# Patient Record
Sex: FEMALE | Race: WHITE | NOT HISPANIC OR LATINO | Employment: FULL TIME | URBAN - METROPOLITAN AREA
[De-identification: names, ages, dates, MRNs, and addresses within clinical notes are randomized per-mention and may not be internally consistent; named-entity substitution may affect disease eponyms.]

---

## 2020-11-09 ENCOUNTER — TRANSFERRED RECORDS (OUTPATIENT)
Dept: HEALTH INFORMATION MANAGEMENT | Facility: CLINIC | Age: 50
End: 2020-11-09

## 2022-09-20 ENCOUNTER — TRANSFERRED RECORDS (OUTPATIENT)
Dept: HEALTH INFORMATION MANAGEMENT | Facility: CLINIC | Age: 52
End: 2022-09-20

## 2022-12-15 ENCOUNTER — PATIENT OUTREACH (OUTPATIENT)
Dept: PULMONOLOGY | Facility: CLINIC | Age: 52
End: 2022-12-15

## 2022-12-15 ENCOUNTER — TELEPHONE (OUTPATIENT)
Dept: PULMONOLOGY | Facility: CLINIC | Age: 52
End: 2022-12-15

## 2022-12-15 DIAGNOSIS — J84.9 ILD (INTERSTITIAL LUNG DISEASE) (H): Primary | ICD-10-CM

## 2022-12-15 NOTE — TELEPHONE ENCOUNTER
Spoke with pt after receiving communication from CAMDEN Garcia, and Dr. Villarreal to add patient on for new consult on 12/21. Recent CT and FPFT completed through [facility] (records received). 6MWT scheduled and appt scheduled with Dr. Villarreal. Details confirmed with pt. Initially CT was scheduled, but deemed no longer needed after Dr. Villarreal reviewed outside CT. This was communicated to patient via CAMDEN Garcia.

## 2022-12-15 NOTE — PROGRESS NOTES
ILD New Patient Referral: Pre visit communication    Patient: Shelby Bennett  Reason for Referral: RA ILD  Referring Physician: Dr. Colton Roblero  Referring Clinic/Contact: Phone#:     Chest CT scan: [facility]  Biopsy: N/A  PFT's:YES. Date-12/14/2022. Location-[facility]   Additional testing: NO    Current symptoms: SOB/JONES  Current related prescriptions: YES. Prednisone    Supplemental oxygen? Unknown    In review of apparent records and conversation with patient; recommend first visit with ILD provider be conducted :  Chest CT and 6 MWT and appointment with Dr. Villarreal    Additional notes:

## 2022-12-16 ENCOUNTER — TELEPHONE (OUTPATIENT)
Dept: PULMONOLOGY | Facility: CLINIC | Age: 52
End: 2022-12-16

## 2022-12-16 NOTE — TELEPHONE ENCOUNTER
Fax cover sheet, demographic sheet, signed CRYSTAL to release information to clinic and medical records request (all office notes with Dr. Chaudhry from 2020 to current including visit with Dr. Chaudhry on 12/19/22) faxed to HIM at [facility] [phone].      [facility]  [phone]. Will follow up on Monday afternoon.

## 2022-12-19 ENCOUNTER — TRANSFERRED RECORDS (OUTPATIENT)
Dept: HEALTH INFORMATION MANAGEMENT | Facility: CLINIC | Age: 52
End: 2022-12-19

## 2022-12-19 LAB
ALT SERPL-CCNC: 15 IU/L (ref 5–35)
AST SERPL-CCNC: 19 U/L (ref 5–34)
CREATININE (EXTERNAL): 0.79 MG/DL (ref 0.5–1.3)
HEP C HIM: NORMAL

## 2022-12-21 ENCOUNTER — OFFICE VISIT (OUTPATIENT)
Dept: PULMONOLOGY | Facility: CLINIC | Age: 52
End: 2022-12-21
Attending: INTERNAL MEDICINE
Payer: COMMERCIAL

## 2022-12-21 VITALS
HEART RATE: 83 BPM | WEIGHT: 179 LBS | DIASTOLIC BLOOD PRESSURE: 81 MMHG | HEIGHT: 65 IN | OXYGEN SATURATION: 96 % | SYSTOLIC BLOOD PRESSURE: 123 MMHG | BODY MASS INDEX: 29.82 KG/M2

## 2022-12-21 DIAGNOSIS — J21.9 BRONCHIOLITIS: ICD-10-CM

## 2022-12-21 DIAGNOSIS — J84.9 ILD (INTERSTITIAL LUNG DISEASE) (H): Primary | ICD-10-CM

## 2022-12-21 DIAGNOSIS — J84.9 ILD (INTERSTITIAL LUNG DISEASE) (H): ICD-10-CM

## 2022-12-21 LAB
6 MIN WALK (FT): 1250 FT
6 MIN WALK (M): 381 M

## 2022-12-21 PROCEDURE — 250N000011 HC RX IP 250 OP 636: Performed by: INTERNAL MEDICINE

## 2022-12-21 PROCEDURE — G0009 ADMIN PNEUMOCOCCAL VACCINE: HCPCS | Performed by: INTERNAL MEDICINE

## 2022-12-21 PROCEDURE — 90677 PCV20 VACCINE IM: CPT | Performed by: INTERNAL MEDICINE

## 2022-12-21 PROCEDURE — 99212 OFFICE O/P EST SF 10 MIN: CPT | Mod: 25 | Performed by: INTERNAL MEDICINE

## 2022-12-21 PROCEDURE — 99215 OFFICE O/P EST HI 40 MIN: CPT | Mod: 25 | Performed by: INTERNAL MEDICINE

## 2022-12-21 PROCEDURE — G0463 HOSPITAL OUTPT CLINIC VISIT: HCPCS | Mod: 25

## 2022-12-21 PROCEDURE — 99417 PROLNG OP E/M EACH 15 MIN: CPT | Performed by: INTERNAL MEDICINE

## 2022-12-21 PROCEDURE — 94618 PULMONARY STRESS TESTING: CPT | Performed by: INTERNAL MEDICINE

## 2022-12-21 RX ORDER — SULFASALAZINE 500 MG/1
500 TABLET ORAL 2 TIMES DAILY
COMMUNITY
Start: 2022-11-16

## 2022-12-21 RX ORDER — FOLIC ACID 1 MG/1
2000 TABLET ORAL DAILY
COMMUNITY
Start: 2022-11-23 | End: 2023-07-14

## 2022-12-21 RX ORDER — LEVOTHYROXINE SODIUM 200 UG/1
175 TABLET ORAL DAILY
COMMUNITY
Start: 2022-11-16 | End: 2024-10-03

## 2022-12-21 RX ORDER — LISINOPRIL 5 MG/1
1 TABLET ORAL DAILY
COMMUNITY
Start: 2022-11-16

## 2022-12-21 RX ORDER — SULFAMETHOXAZOLE/TRIMETHOPRIM 800-160 MG
1 TABLET ORAL
COMMUNITY
Start: 2022-12-16 | End: 2023-04-13

## 2022-12-21 RX ORDER — PREDNISONE 10 MG/1
TABLET ORAL
COMMUNITY
Start: 2022-12-15 | End: 2024-10-03

## 2022-12-21 RX ORDER — HYDROXYCHLOROQUINE SULFATE 200 MG/1
200 TABLET, FILM COATED ORAL 2 TIMES DAILY
COMMUNITY
Start: 2022-11-23

## 2022-12-21 RX ADMIN — PNEUMOCOCCAL 20-VALENT CONJUGATE VACCINE 0.5 ML
2.2; 2.2; 2.2; 2.2; 2.2; 2.2; 2.2; 2.2; 2.2; 2.2; 2.2; 2.2; 2.2; 2.2; 2.2; 2.2; 4.4; 2.2; 2.2; 2.2 INJECTION, SUSPENSION INTRAMUSCULAR at 16:00

## 2022-12-21 ASSESSMENT — PAIN SCALES - GENERAL: PAINLEVEL: NO PAIN (0)

## 2022-12-21 NOTE — LETTER
12/21/2022         RE: Shelby Bennett  [address] [address] [place] [address]        Dear Colleague,    Thank you for referring your patient, Shelby Bennett, to the [facility] [place]. Please see a copy of my visit note below.    [facility]    Reason for Visit  Shelby Bennett is a 52 year old year old female who is being seen for Interstitial Lung Disease (ILD) (New ILD )    HPI   Shelby Bennett is a 52-year-old new patient who is being seen for evaluation and management of interstitial lung disease.  She has rheumatoid arthritis, which was diagnosed in 2003.  She had been treated with methotrexate from 2011 until a few months ago.  Her current medications include Plaquenil and sulfasalazine.  In 2007, she developed nausea, vomiting, weight loss, dyspnea on exertion and elevated creatinine.  Abdomen CT showed multiple liver masses.  Liver biopsy was performed that, by report, showed extensive granulomatous inflammation with focal necrosis.  Special stains and tissue cultures were negative.  She was given a diagnosis of possible sarcoidosis at that time but did not get treated.  However, a chest x-ray report from 2007 indicated normal lungs.    She was in her usual state of health until this past summer.  She was able to walk without limitation, even [place]hill at their cabin.  She then noticed some worsening of her rheumatoid arthritis, and sulfasalazine was added over the summer.  This summer, she also developed a cough.  The cough is dry, and she denies postnasal drip.  She does not endorse GERD symptoms, but she wonders if she might have GERD.  She also endorses dyspnea on exertion for approximately a month, and she notices this when she walks up stairs or walks uphill.  She was evaluated for her cough and dyspnea on exertion this fall.  She was told that she had pneumonia  based on an abnormal chest x-ray, and she was  treated with at least 3 different antibiotics.  She eventually was referred to a pulmonologist and saw Dr. Coats in [place].  He ordered a high-resolution chest CT and a PFT and diagnosed her with interstitial lung disease and started prednisone 40 mg daily on December 15.    Today, Shelby reports that the dyspnea on exertion  seems to be better with prednisone.  She also thinks that the dry cough has decreased with prednisone.  She is very active and [place] 12,000 [place]  She reports that her joints are under good control now.  She denies paroxysmal nocturnal dyspnea, fevers, skin rashes, syncope, palpitations or wheezing.  She has received the flu vaccine.  There are no PFTs or chest CT scans prior to December for comparison.    ILD exposure questions:  Occupation:   Asbestos: no  Silica: no  Mold: no  Pet bird: no  Hot tub: no  Portable humidifier: no  Brass or woodwind instruments: not since high school  Smoking tobacco or marijuana: no, no vaping  Feather pillow/blanket: no  Gardening: yes  Hobbies: walking, gardening, reading, fishing, travelling  Chemotherapy or radiation therapy: no  Fam hx of ILD: no           Current Outpatient Medications   Medication     cholecalciferol (VITAMIN D3) 25 mcg (1000 units) capsule     folic acid (FOLVITE) 1 MG tablet     hydroxychloroquine (PLAQUENIL) 200 MG tablet     levothyroxine (SYNTHROID/LEVOTHROID) 200 MCG tablet     lisinopril (ZESTRIL) 5 MG tablet     predniSONE (DELTASONE) 10 MG tablet     sulfamethoxazole-trimethoprim (BACTRIM DS) 800-160 MG tablet     sulfaSALAzine (AZULFIDINE) 500 MG tablet     No current facility-administered medications for this visit.     No Known Allergies  Past Medical History:   Diagnosis Date     ILD (interstitial lung disease) (H)     RA ILD dx 2022     RA (rheumatoid arthritis) (H)     dx 2003; 2022 +RF and CCP, but initially seroneg; methotrexate [id number]       No past  "surgical history on file.    Social History     Socioeconomic History     Marital status:      Spouse name: Not on file     Number of children: Not on file     Years of education: Not on file     Highest education level: Not on file   Occupational History     Not on file   Tobacco Use     Smoking status: Never     Smokeless tobacco: Never   Substance and Sexual Activity     Alcohol use: Yes     Drug use: Never     Sexual activity: Not on file   Other Topics Concern     Not on file   Social History Narrative     Not on file     Social Determinants of Health     Financial Resource Strain: Not on file   Food Insecurity: Not on file   Transportation Needs: Not on file   Physical Activity: Not on file   Stress: Not on file   Social Connections: Not on file   Intimate Partner Violence: Not on file   Housing Stability: Not on file       Family History   Problem Relation Age of Onset     Rheumatoid Arthritis Sister      Interstitial Lung Disease No family hx of          Vitals: /81 (BP Location: Right arm, Patient Position: Sitting, Cuff Size: Adult Regular)   Pulse 83   Ht 1.651 m (5' 5\")   Wt 81.2 kg (179 lb)   SpO2 96%   BMI 29.79 kg/m      Exam:   GENERAL APPEARANCE: Well developed, well nourished, alert, and in no apparent distress.  LYMPHATICS: No significant cervical, or supraclavicular nodes.  RESP: good air flow throughout.  No crackles. No rhonchi. No wheezes.  Bilateral scattered inspiratory squeaks.  CV: Normal S1, S2, regular rhythm, normal rate. No murmur.  No LE edema.   MS: extremities normal. No clubbing. No cyanosis.  SKIN: no rash on limited exam.  NEURO: Mentation intact, speech normal, normal gait and stance.  PSYCH: mentation appears normal. and affect normal/bright.    Results:  Recent Results (from the past 168 hour(s))   6 minute walk test    Collection Time: 12/21/22 12:00 AM   Result Value Ref Range    6 min walk (FT) 1,250 ft    6 Min Walk (M) 381 m   General PFT Lab (Please " always keep checked)    Collection Time: 12/21/22  1:46 PM   Result Value Ref Range    FIO2-Pre 21.00 %        FVC FEV1 TLC DLCO   12- [facility] 2.54 71% 2.31 81% 4.01 73% 11.14 52%       I reviewed pulmonary function test that was performed at [facility] on December 14, per my table above.  This shows mild restriction with a moderate diffusion defect.  I also reviewed 6-minute walk test that was performed today.  Walk distance is reduced on room air without hypoxemia.    I also reviewed outside chest CT from December 14.  This shows mosaic attenuation and air trapping, traction bronchiectasis, peribronchovascular distribution of groundglass and reticular opacities.  This is not a UIP pattern.  Differential includes fibrotic NSIP with small airways disease.    I reviewed results with the patient.      Assessment and plan:  Shelby Bennett is a 52-year-old new patient who is being seen for evaluation and management of interstitial lung disease.   1.  Rheumatoid arthritis interstitial lung disease.  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  Physical exam corroborates this with the presence of inspiratory squeaks.  The combination of fibrotic NSIP and small airways disease would be consistent with rheumatoid arthritis lung disease.  The absence of a UIP pattern on chest CT might indicate a better prognosis.  She is not a smoker, so smoking-related ILD is unlikely.  She is on no medications that would cause ILD, and I think that methotrexate is an unlikely culprit given that she has been on it for 11 years and developed symptoms just in the past few months.  I also think that the CT scan pattern is less likely to be sarcoidosis.     There is evidence of inflammation on the chest CT scan by my review, so I think that prednisone is reasonable at this time.  I will review the chest CT scan with Dr. Smart in radiology.  We will  contact Shelby with further recommendations for the prednisone taper, but for now she will continue 40 mg daily.  If she needs a biologic for her rheumatoid arthritis, then I would avoid leflunomide.  Any of the other Biologics would be okay, such as abatacept, rituximab or tocilizumab.  There is good data that tocilizumab is beneficial for scleroderma ILD, so it is possible that it might be beneficial for rheumatoid arthritis ILD.  Nintedanib also might be an option in the future, more as an antifibrotic.  I would not start that now because of the risk to benefit ratio with high incidence of diarrhea.  I will get an echocardiogram in the next 1 to 2 weeks to evaluate for pulmonary hypertension as the PFT shows that the diffusing capacity is decreased out of proportion to the FVC.  I also recommended that she get the pneumonia vaccine, and she agreed.  We gave her the Prevnar 20 today.  I encouraged her to stay physically active with minimum of 10,000 steps per day.  She will return in 3 months with full PFT and repeat 6-minute walk test.  2.  High risk medication immunosuppression monitoring.  She is on Bactrim for pneumocystis prophylaxis, and that will be continued.  I also counseled her to wear a mask in public.  She asked about travel, and that would be okay as long as she wears a mask.  I spent 132 minutes reviewing chart, talking with and examining patient, reviewing test results, formulating plan and documentation on the day of the encounter (excluding PFT review).    Again, thank you for allowing me to participate in the care of your patient.        Sincerely,        Maria Antonia Villarreal MD

## 2022-12-21 NOTE — PROGRESS NOTES
[facility] Disease [facility]    Reason for Visit  Shelby Bennett is a 52 year old year old female who is being seen for Interstitial Lung Disease (ILD) (New ILD )    HPI   Shelby Bennett is a 52-year-old new patient who is being seen for evaluation and management of interstitial lung disease.  She has rheumatoid arthritis, which was diagnosed in 2003.  She had been treated with methotrexate from 2011 until a few months ago.  Her current medications include Plaquenil and sulfasalazine.  In 2007, she developed nausea, vomiting, weight loss, dyspnea on exertion and elevated creatinine.  Abdomen CT showed multiple liver masses.  Liver biopsy was performed that, by report, showed extensive granulomatous inflammation with focal necrosis.  Special stains and tissue cultures were negative.  She was given a diagnosis of possible sarcoidosis at that time but did not get treated.  However, a chest x-ray report from 2007 indicated normal lungs.    She was in her usual state of health until this past summer.  She was able to walk without limitation, even [place]hill [place] their cabin.  She then noticed some worsening of her rheumatoid arthritis, and sulfasalazine was added over the summer.  This summer, she also developed a cough.  The cough is dry, and she denies postnasal drip.  She does not endorse GERD symptoms, but she wonders if she might have GERD.  She also endorses dyspnea on exertion for approximately a month, and she notices this when she walks up stairs or walks uphill.  She was evaluated for her cough and dyspnea on exertion this fall.  She was told that she had pneumonia  based on an abnormal chest x-ray, and she was treated with at least 3 different antibiotics.  She eventually was referred to a pulmonologist and saw Dr. Coats in [place].  He ordered a high-resolution chest CT and a PFT and diagnosed her with interstitial lung disease and started prednisone 40 mg daily on December 15.    Today,  Shelby reports that the dyspnea on exertion  seems to be better with prednisone.  She also thinks that the dry cough has decreased with prednisone.  She is very active and averages 12,000 steps [place]  She reports that her joints are under good control now.  She denies paroxysmal nocturnal dyspnea, fevers, skin rashes, syncope, palpitations or wheezing.  She has received the flu vaccine.  There are no PFTs or chest CT scans prior to December for comparison.    ILD exposure questions:  Occupation:   Asbestos: no  Silica: no  Mold: no  Pet bird: no  Hot tub: no  Portable humidifier: no  Brass or woodwind instruments: not since high school  Smoking tobacco or marijuana: no, no vaping  Feather pillow/blanket: no  Gardening: yes  Hobbies: walking, gardening, reading, fishing, travelling  Chemotherapy or radiation therapy: no  Fam hx of ILD: no           Current Outpatient Medications   Medication     cholecalciferol (VITAMIN D3) 25 mcg (1000 units) capsule     folic acid (FOLVITE) 1 MG tablet     hydroxychloroquine (PLAQUENIL) 200 MG tablet     levothyroxine (SYNTHROID/LEVOTHROID) 200 MCG tablet     lisinopril (ZESTRIL) 5 MG tablet     predniSONE (DELTASONE) 10 MG tablet     sulfamethoxazole-trimethoprim (BACTRIM DS) 800-160 MG tablet     sulfaSALAzine (AZULFIDINE) 500 MG tablet     No current facility-administered medications for this visit.     No Known Allergies  Past Medical History:   Diagnosis Date     ILD (interstitial lung disease) (H)     RA ILD dx 2022     RA (rheumatoid arthritis) (H)     dx 2003; 2022 +RF and CCP, but initially seroneg; methotrexate [id number]       No past surgical history on file.    Social History     Socioeconomic History     Marital status:      Spouse name: Not on file     Number of children: Not on file     Years of education: Not on file     Highest education level: Not on file   Occupational History     Not on file   Tobacco Use  "    Smoking status: Never     Smokeless tobacco: Never   Substance and Sexual Activity     Alcohol use: Yes     Drug use: Never     Sexual activity: Not on file   Other Topics Concern     Not on file   Social History Narrative     Not on file     Social Determinants of Health     Financial Resource Strain: Not on file   Food Insecurity: Not on file   Transportation Needs: Not on file   Physical Activity: Not on file   Stress: Not on file   Social Connections: Not on file   Intimate Partner Violence: Not on file   Housing Stability: Not on file       Family History   Problem Relation Age of Onset     Rheumatoid Arthritis Sister      Interstitial Lung Disease No family hx of          Vitals: /81 (BP Location: Right arm, Patient Position: Sitting, Cuff Size: Adult Regular)   Pulse 83   Ht 1.651 m (5' 5\")   Wt 81.2 kg (179 lb)   SpO2 96%   BMI 29.79 kg/m      Exam:   GENERAL APPEARANCE: Well developed, well nourished, alert, and in no apparent distress.  LYMPHATICS: No significant cervical, or supraclavicular nodes.  RESP: good air flow throughout.  No crackles. No rhonchi. No wheezes.  Bilateral scattered inspiratory squeaks.  CV: Normal S1, S2, regular rhythm, normal rate. No murmur.  No LE edema.   MS: extremities normal. No clubbing. No cyanosis.  SKIN: no rash on limited exam.  NEURO: Mentation intact, speech normal, normal gait and stance.  PSYCH: mentation appears normal. and affect normal/bright.    Results:  Recent Results (from the past 168 hour(s))   6 minute walk test    Collection Time: 12/21/22 12:00 AM   Result Value Ref Range    6 min walk (FT) 1,250 ft    6 Min Walk (M) 381 m   General PFT Lab (Please always keep checked)    Collection Time: 12/21/22  1:46 PM   Result Value Ref Range    FIO2-Pre 21.00 %        FVC FEV1 TLC DLCO   12- [facility] 2.54 71% 2.31 81% 4.01 73% 11.14 52%       I reviewed pulmonary function test that was performed at [facility] on December 14, per my table above.  " This shows mild restriction with a moderate diffusion defect.  I also reviewed 6-minute walk test that was performed today.  Walk distance is reduced on room air without hypoxemia.    I also reviewed outside chest CT from December 14.  This shows mosaic attenuation and air trapping, traction bronchiectasis, peribronchovascular distribution of groundglass and reticular opacities.  This is not a UIP pattern.  Differential includes fibrotic NSIP with small airways disease.    I reviewed results with the patient.      Assessment and plan:  Shelby Bennett is a 52-year-old new patient who is being seen for evaluation and management of interstitial lung disease.   1.  Rheumatoid arthritis interstitial lung disease.  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  Physical exam corroborates this with the presence of inspiratory squeaks.  The combination of fibrotic NSIP and small airways disease would be consistent with rheumatoid arthritis lung disease.  The absence of a UIP pattern on chest CT might indicate a better prognosis.  She is not a smoker, so smoking-related ILD is unlikely.  She is on no medications that would cause ILD, and I think that methotrexate is an unlikely culprit given that she has been on it for 11 years and developed symptoms just in the past few months.  I also think that the CT scan pattern is less likely to be sarcoidosis.     There is evidence of inflammation on the chest CT scan by my review, so I think that prednisone is reasonable at this time.  I will review the chest CT scan with Dr. Smart in radiology.  We will contact Shelby with further recommendations for the prednisone taper, but for now she will continue 40 mg daily.  If she needs a biologic for her rheumatoid arthritis, then I would avoid leflunomide.  Any of the other Biologics would be okay, such as abatacept, rituximab or tocilizumab.  There is  good data that tocilizumab is beneficial for scleroderma ILD, so it is possible that it might be beneficial for rheumatoid arthritis ILD.  Nintedanib also might be an option in the future, more as an antifibrotic.  I would not start that now because of the risk to benefit ratio with high incidence of diarrhea.  I will get an echocardiogram in the next 1 to 2 weeks to evaluate for pulmonary hypertension as the PFT shows that the diffusing capacity is decreased out of proportion to the FVC.  I also recommended that she get the pneumonia vaccine, and she agreed.  We gave her the Prevnar 20 today.  I encouraged her to stay physically active with minimum of 10,000 steps per day.  She will return in 3 months with full PFT and repeat 6-minute walk test.  2.  High risk medication immunosuppression monitoring.  She is on Bactrim for pneumocystis prophylaxis, and that will be continued.  I also counseled her to wear a mask in public.  She asked about travel, and that would be okay as long as she wears a mask.  I spent 132 minutes reviewing chart, talking with and examining patient, reviewing test results, formulating plan and documentation on the day of the encounter (excluding PFT review).    Addendum: I reviewed chest CT scan with Dr. Smart.  He agrees that the pattern looks like fibrotic NSIP with bronchiolitis (small airways disease) the pattern and is not consistent with UIP pattern.  This is likely related to her rheumatoid arthritis.  Sarcoidosis is less likely, and he also thinks clinically that methotrexate would be less likely as well.  I would rather recommend prednisone 40 mg daily for a total of 4 weeks, then 30 mg daily for 4 weeks, then 20 mg daily for 4 weeks, then 10 mg daily with no further taper until she comes back to see me in clinic.  For the follicular bronchiolitis, I also would recommend an ICS/LABA inhaler (Breo Ellipta) and gargle with water after use.  Possible side effects include sore throat and  and or hoarse voice.  This will require a prior Auth.

## 2022-12-21 NOTE — NURSING NOTE
Chief Complaint   Patient presents with     Interstitial Lung Disease (ILD)     New ILD      Vitals were taken and medications were reconciled.     Suha Macias RMA  2:29 PM

## 2022-12-21 NOTE — PATIENT INSTRUCTIONS
Echo in 1-2 weeks.  We will contact you with further plan for prednisone  Wear a mask in public  We gave you the pneumonia prevnar 20 vaccine today.

## 2022-12-22 ENCOUNTER — TELEPHONE (OUTPATIENT)
Dept: PULMONOLOGY | Facility: CLINIC | Age: 52
End: 2022-12-22

## 2022-12-22 DIAGNOSIS — J84.9 ILD (INTERSTITIAL LUNG DISEASE) (H): Primary | ICD-10-CM

## 2022-12-22 LAB — FIO2-PRE: 21 %

## 2022-12-22 RX ORDER — PREDNISONE 20 MG/1
TABLET ORAL
Qty: 280 TABLET | Refills: 0 | Status: SHIPPED | OUTPATIENT
Start: 2022-12-22 | End: 2024-10-03

## 2022-12-22 RX ORDER — FLUTICASONE FUROATE AND VILANTEROL 200; 25 UG/1; UG/1
1 POWDER RESPIRATORY (INHALATION) DAILY
Qty: 1 EACH | Refills: 3 | Status: SHIPPED | OUTPATIENT
Start: 2022-12-22 | End: 2023-04-13

## 2022-12-22 RX ORDER — FLUTICASONE FUROATE AND VILANTEROL 100; 25 UG/1; UG/1
1 POWDER RESPIRATORY (INHALATION) DAILY
Qty: 1 EACH | Refills: 11 | Status: SHIPPED | OUTPATIENT
Start: 2022-12-22 | End: 2023-04-13

## 2022-12-22 NOTE — TELEPHONE ENCOUNTER
Spoke with pt via phone and gave update per Dr. Villarreal's office visit note. Placed order for Prednisone taper and Breo inhaler. Pt states understanding of plan.    Cristina Last RN

## 2022-12-23 ENCOUNTER — TELEPHONE (OUTPATIENT)
Facility: CLINIC | Age: 52
End: 2022-12-23

## 2022-12-23 NOTE — TELEPHONE ENCOUNTER
PRIOR AUTHORIZATION DENIED    Medication: fluticasone-vilanterol (BREO ELLIPTA) 100-25 MCG/ACT inhaler - EPA DENIED    Denial Date: 12/23/2022    Denial Rational:       Appeal Information:

## 2022-12-25 ENCOUNTER — HOSPITAL ENCOUNTER (EMERGENCY)
Facility: CLINIC | Age: 52
Discharge: HOME | End: 2022-12-25
Attending: EMERGENCY MEDICINE | Admitting: EMERGENCY MEDICINE
Payer: COMMERCIAL

## 2022-12-25 ENCOUNTER — APPOINTMENT (OUTPATIENT)
Dept: ULTRASOUND IMAGING | Facility: CLINIC | Age: 52
End: 2022-12-25
Attending: EMERGENCY MEDICINE
Payer: COMMERCIAL

## 2022-12-25 VITALS
TEMPERATURE: 98.2 F | DIASTOLIC BLOOD PRESSURE: 86 MMHG | OXYGEN SATURATION: 98 % | HEART RATE: 94 BPM | RESPIRATION RATE: 18 BRPM | SYSTOLIC BLOOD PRESSURE: 145 MMHG

## 2022-12-25 DIAGNOSIS — T14.8XXA BRUISING: ICD-10-CM

## 2022-12-25 LAB
BASOPHILS # BLD AUTO: 0.1 10E3/UL (ref 0–0.2)
BASOPHILS NFR BLD AUTO: 1 %
EOSINOPHIL # BLD AUTO: 0.1 10E3/UL (ref 0–0.7)
EOSINOPHIL NFR BLD AUTO: 1 %
ERYTHROCYTE [DISTWIDTH] IN BLOOD BY AUTOMATED COUNT: 12.6 % (ref 10–15)
HCT VFR BLD AUTO: 44.3 % (ref 35–47)
HGB BLD-MCNC: 14.6 G/DL (ref 11.7–15.7)
HOLD SPECIMEN: NORMAL
IMM GRANULOCYTES # BLD: 0.2 10E3/UL
IMM GRANULOCYTES NFR BLD: 1 %
LYMPHOCYTES # BLD AUTO: 2.3 10E3/UL (ref 0.8–5.3)
LYMPHOCYTES NFR BLD AUTO: 14 %
MCH RBC QN AUTO: 31.9 PG (ref 26.5–33)
MCHC RBC AUTO-ENTMCNC: 33 G/DL (ref 31.5–36.5)
MCV RBC AUTO: 97 FL (ref 78–100)
MONOCYTES # BLD AUTO: 1.5 10E3/UL (ref 0–1.3)
MONOCYTES NFR BLD AUTO: 9 %
NEUTROPHILS # BLD AUTO: 12.3 10E3/UL (ref 1.6–8.3)
NEUTROPHILS NFR BLD AUTO: 74 %
NRBC # BLD AUTO: 0 10E3/UL
NRBC BLD AUTO-RTO: 0 /100
PLATELET # BLD AUTO: 412 10E3/UL (ref 150–450)
RBC # BLD AUTO: 4.58 10E6/UL (ref 3.8–5.2)
WBC # BLD AUTO: 16.6 10E3/UL (ref 4–11)

## 2022-12-25 PROCEDURE — 36415 COLL VENOUS BLD VENIPUNCTURE: CPT | Performed by: EMERGENCY MEDICINE

## 2022-12-25 PROCEDURE — 99284 EMERGENCY DEPT VISIT MOD MDM: CPT | Mod: 25

## 2022-12-25 PROCEDURE — 93971 EXTREMITY STUDY: CPT | Mod: LT

## 2022-12-25 PROCEDURE — 85025 COMPLETE CBC W/AUTO DIFF WBC: CPT | Performed by: EMERGENCY MEDICINE

## 2022-12-25 ASSESSMENT — ACTIVITIES OF DAILY LIVING (ADL): ADLS_ACUITY_SCORE: 35

## 2022-12-25 NOTE — ED TRIAGE NOTES
Here for concern of rash to left upper arm since this morning. Patient marked it with 2 dots and the redness have appear to be spreading. Recently diagnose with RH and interstitial lung disease, is currently taking prednisone, and got pneumonia vaccine on 12/22. ABCs intact.      Triage Assessment     Row Name 12/25/22 0014       Triage Assessment (Adult)    Airway WDL WDL       Respiratory WDL    Respiratory WDL WDL       Cardiac WDL    Cardiac WDL WDL

## 2022-12-25 NOTE — ED PROVIDER NOTES
Received signout from Dr. Cárdenas to follow-up on ultrasound imaging.  Ultrasound upper extremity- no DVT.  I would doubt cellulitis or concerning causes she is otherwise appropriate outpatient management told to follow-up primary doctor.     Markus De Souza MD  12/25/22 0204

## 2022-12-25 NOTE — ED PROVIDER NOTES
History     Chief Complaint:    Rash      HPI   Shelby Bennett is a 52 year old female who presents with left arm pain and rash.    Patient is a 52-year-old female who is otherwise healthy.  Patient is on prednisone and presents the emergency room noting discoloration of the skin on the left arm.  It was painful near the antecubital area but since is progressed up the arm.  She is had no chest pain or shortness of breath no history of prior DVT.    Review of Systems  Negative for trauma or injury negative for chest pain or shortness of breath.    Allergies:    No Known Allergies      Medications:      cholecalciferol (VITAMIN D3) 25 mcg (1000 units) capsule  fluticasone-vilanterol (BREO ELLIPTA) 100-25 MCG/ACT inhaler  fluticasone-vilanterol (BREO ELLIPTA) 200-25 MCG/ACT inhaler  folic acid (FOLVITE) 1 MG tablet  hydroxychloroquine (PLAQUENIL) 200 MG tablet  levothyroxine (SYNTHROID/LEVOTHROID) 200 MCG tablet  lisinopril (ZESTRIL) 5 MG tablet  predniSONE (DELTASONE) 10 MG tablet  predniSONE (DELTASONE) 20 MG tablet  sulfamethoxazole-trimethoprim (BACTRIM DS) 800-160 MG tablet  sulfaSALAzine (AZULFIDINE) 500 MG tablet        Past Medical History:        Past Medical History:   Diagnosis Date     ILD (interstitial lung disease) (H)      RA (rheumatoid arthritis) (H)      Patient Active Problem List    Diagnosis Date Noted     Pain in joint of right shoulder 09/18/2015     Priority: Medium     Diagnosis updated by automated process. Provider to review and confirm.          Past Surgical History:      No past surgical history on file.    Family History:      Family History   Problem Relation Age of Onset     Rheumatoid Arthritis Sister      Interstitial Lung Disease No family hx of        Social History:    Sarah Erwin  The patient presents to the ED with     Physical Exam     Patient Vitals for the past 24 hrs:   BP Temp Temp src Pulse Resp SpO2   12/25/22 0015 (!) 145/86 98.2  F (36.8  C) Temporal 94 18  98 %       Physical Exam  Vitals reviewed.   Cardiovascular:      Rate and Rhythm: Normal rate.   Pulmonary:      Effort: Pulmonary effort is normal.   Skin:     Comments: Left arm: There is a streaky area of what looks to be a bruise or some type of ecchymotic lesion over the proximal medial aspect of the left upper arm.  This is from the near the antecubital fossa proximal.  There is no erythema there is no warmth there is no open wound   Neurological:      Mental Status: She is alert.           Emergency Department Course       No results found for this or any previous visit.    Imaging:  US Upper Extremity Venous Duplex Left   Final Result   IMPRESSION:    1.  No deep venous thrombosis in the left upper extremity.        Report per radiology    Laboratory:  Labs Ordered and Resulted from Time of ED Arrival to Time of ED Departure - No data to display      Emergency Department Course:             Reviewed:    I reviewed nursing notes and vitals    Assessments:   I obtained history and examined the patient as noted above.    I rechecked the patient and explained findings.       Consults:            Interventions:    Medications - No data to display    Disposition:  Care of the patient was transferred to my colleague pending US results       Impression & Plan             Medical Decision Making:  Patient presents with bruise-like discoloration of the left upper arm this was nontraumatic.  Because of the etiology of this is unclear platelet count is unremarkable ultrasound is ordered but likely will be negative doubt superficial thrombophlebitis or DVT was the on the emergency that I can consider with the area of ecchymosis that is nontraumatic.  When about prednisone and its use on the effects on the skin or some type of nontraumatic bruise that spontaneously developed.  No concerns with petechiae no other region of bruising or discoloration recommended follow-up as an outpatient and would be discharged home if  ultrasound is negative.  Covid-19  Shelby Bennett was evaluated during a global COVID-19 pandemic, which necessitated consideration that the patient might be at risk for infection with the SARS-CoV-2 virus that causes COVID-19.   Applicable protocols for evaluation were followed during the patient's care.   COVID-19 was considered as part of the patient's evaluation.    Diagnosis:    ICD-10-CM    1. Bruising  T14.8XXA           Discharge Medications:  Discharge Medication List as of 12/25/2022  1:46 AM            Scribe Disclosure:  IJacques MD, am serving as a scribe at 12:28 AM on 12/25/2022 to document services personally performed by Jacques Cárdenas MD based on my observations and the provider's statements to me.      Jacques áCrdenas MD  12/31/22 2005

## 2022-12-30 ENCOUNTER — HOSPITAL ENCOUNTER (OUTPATIENT)
Dept: CARDIOLOGY | Facility: CLINIC | Age: 52
Discharge: HOME | End: 2022-12-30
Attending: INTERNAL MEDICINE | Admitting: INTERNAL MEDICINE
Payer: COMMERCIAL

## 2022-12-30 DIAGNOSIS — J84.9 ILD (INTERSTITIAL LUNG DISEASE) (H): ICD-10-CM

## 2022-12-30 PROCEDURE — 93306 TTE W/DOPPLER COMPLETE: CPT | Mod: 26 | Performed by: INTERNAL MEDICINE

## 2022-12-30 PROCEDURE — 93306 TTE W/DOPPLER COMPLETE: CPT

## 2023-01-06 ENCOUNTER — TRANSFERRED RECORDS (OUTPATIENT)
Dept: HEALTH INFORMATION MANAGEMENT | Facility: CLINIC | Age: 53
End: 2023-01-06

## 2023-01-24 ENCOUNTER — TRANSFERRED RECORDS (OUTPATIENT)
Dept: HEALTH INFORMATION MANAGEMENT | Facility: CLINIC | Age: 53
End: 2023-01-24
Payer: COMMERCIAL

## 2023-04-01 ENCOUNTER — HEALTH MAINTENANCE LETTER (OUTPATIENT)
Age: 53
End: 2023-04-01

## 2023-04-12 ASSESSMENT — ENCOUNTER SYMPTOMS
DYSPNEA ON EXERTION: 1
HOT FLASHES: 0
HEMOPTYSIS: 0
WHEEZING: 0
DECREASED LIBIDO: 0
STIFFNESS: 0
COUGH: 1
SNORES LOUDLY: 1
BACK PAIN: 0
ARTHRALGIAS: 0
JOINT SWELLING: 0
COUGH DISTURBING SLEEP: 0
MUSCLE WEAKNESS: 0
SPUTUM PRODUCTION: 1
MUSCLE CRAMPS: 1
SHORTNESS OF BREATH: 0
MYALGIAS: 0
POSTURAL DYSPNEA: 0
NECK PAIN: 0

## 2023-04-13 ENCOUNTER — OFFICE VISIT (OUTPATIENT)
Dept: PULMONOLOGY | Facility: CLINIC | Age: 53
End: 2023-04-13
Attending: INTERNAL MEDICINE
Payer: COMMERCIAL

## 2023-04-13 VITALS
DIASTOLIC BLOOD PRESSURE: 80 MMHG | HEIGHT: 69 IN | WEIGHT: 189 LBS | HEART RATE: 77 BPM | BODY MASS INDEX: 27.99 KG/M2 | OXYGEN SATURATION: 97 % | SYSTOLIC BLOOD PRESSURE: 128 MMHG

## 2023-04-13 DIAGNOSIS — M06.9 RHEUMATOID ARTHRITIS, INVOLVING UNSPECIFIED SITE, UNSPECIFIED WHETHER RHEUMATOID FACTOR PRESENT (H): ICD-10-CM

## 2023-04-13 DIAGNOSIS — J84.9 ILD (INTERSTITIAL LUNG DISEASE) (H): ICD-10-CM

## 2023-04-13 DIAGNOSIS — J84.9 INTERSTITIAL LUNG DISEASE (H): Primary | ICD-10-CM

## 2023-04-13 LAB
6 MIN WALK (FT): 1215 FT
6 MIN WALK (M): 370 M

## 2023-04-13 PROCEDURE — 94375 RESPIRATORY FLOW VOLUME LOOP: CPT | Performed by: INTERNAL MEDICINE

## 2023-04-13 PROCEDURE — 99214 OFFICE O/P EST MOD 30 MIN: CPT | Mod: 25 | Performed by: INTERNAL MEDICINE

## 2023-04-13 PROCEDURE — 99213 OFFICE O/P EST LOW 20 MIN: CPT | Performed by: INTERNAL MEDICINE

## 2023-04-13 PROCEDURE — 94729 DIFFUSING CAPACITY: CPT | Performed by: INTERNAL MEDICINE

## 2023-04-13 PROCEDURE — 94726 PLETHYSMOGRAPHY LUNG VOLUMES: CPT | Performed by: INTERNAL MEDICINE

## 2023-04-13 PROCEDURE — 94618 PULMONARY STRESS TESTING: CPT | Performed by: INTERNAL MEDICINE

## 2023-04-13 RX ORDER — FLUTICASONE FUROATE AND VILANTEROL 200; 25 UG/1; UG/1
1 POWDER RESPIRATORY (INHALATION) DAILY
Qty: 1 EACH | Refills: 3 | Status: SHIPPED | OUTPATIENT
Start: 2023-04-13 | End: 2023-08-08

## 2023-04-13 RX ORDER — MYCOPHENOLATE MOFETIL 500 MG/1
TABLET ORAL
Qty: 90 TABLET | Refills: 3 | Status: CANCELLED | OUTPATIENT
Start: 2023-04-13

## 2023-04-13 RX ORDER — SULFAMETHOXAZOLE/TRIMETHOPRIM 800-160 MG
1 TABLET ORAL DAILY
Qty: 90 TABLET | Refills: 3 | Status: SHIPPED | OUTPATIENT
Start: 2023-04-13 | End: 2023-04-13

## 2023-04-13 RX ORDER — SULFAMETHOXAZOLE/TRIMETHOPRIM 800-160 MG
1 TABLET ORAL DAILY
Qty: 90 TABLET | Refills: 3 | Status: SHIPPED | OUTPATIENT
Start: 2023-04-13 | End: 2023-10-20

## 2023-04-13 RX ORDER — FLUTICASONE FUROATE AND VILANTEROL 200; 25 UG/1; UG/1
1 POWDER RESPIRATORY (INHALATION) DAILY
Qty: 1 EACH | Refills: 3 | Status: SHIPPED | OUTPATIENT
Start: 2023-04-13 | End: 2023-04-13

## 2023-04-13 RX ORDER — MYCOPHENOLATE MOFETIL 500 MG/1
TABLET ORAL
Qty: 120 TABLET | Refills: 3 | Status: SHIPPED | OUTPATIENT
Start: 2023-04-13 | End: 2023-08-08

## 2023-04-13 RX ORDER — ABATACEPT 125 MG/ML
INJECTION, SOLUTION SUBCUTANEOUS
COMMUNITY
Start: 2023-04-06 | End: 2024-10-03

## 2023-04-13 ASSESSMENT — PAIN SCALES - GENERAL: PAINLEVEL: NO PAIN (0)

## 2023-04-13 NOTE — NURSING NOTE
Chief Complaint   Patient presents with     Interstitial Lung Disease (ILD)     ILD follow up      Vitals were taken and medications were reconciled.     Suha Macias RMA  9:59 AM

## 2023-04-13 NOTE — PROGRESS NOTES
Reason for Visit  Shelby Bennett is a 52 year old year old female who is being seen for No chief complaint on file.    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     She was seen as a new consultation in 12/2022 for evaluation of worsening exertional dyspnea and concern for interstitial lung disease in the setting of her rheuamtoid arthritis. She initially saw Dr. Coats at [facility] in [place] who obtained a high resolution CT showing mosaic attenuation and air trapping, traction bronchiectasis, peribronchovascular distribution of groundglass and reticular opacities not in a UIP pattern. Her PFTs showed mild restriction with moderate diffusion defect. She was recommended to continue her prior started prednisone 40 mg daily due to evidence of inflammation on her CT, with a taper plan down to 10 mg daily which she is currently on. She was also started on Breo ellipta for her bronchiolitis. Echocardiogram was obtained to evaluate for pulmonary hypertension as her PFT showed a diffusing capacity decreased out of proportion to her FVC.  This was normal without evidence of pulmonary hypertension.    Today, she states that she feels perhaps a bit better as compared to last visit. She still gets winded with walking up a long two flights of stairs at the [facility] with a somewhat heavy bag. Is still able to get 10,000 steps a day. Overall states that she is not quite moving as fast as 1 year ago, however, she has had improvement overall since starting prednisone. Continues to need to clear her throat frequently with some phlegm. Cough is periodic and worse in summer and fall. More rarely, she will cough up greenish sputum. Does note that she will get cramps in her legs at night. Some mild snoring, which is new since weight gain with  prednisone. No worsening daytime fatigue.    Recently started abatacept (Orencia) for rheumatoid arthritis. Has been on this for 5 weeks.           Current Outpatient Medications   Medication     cholecalciferol (VITAMIN D3) 25 mcg (1000 units) capsule     fluticasone-vilanterol (BREO ELLIPTA) 100-25 MCG/ACT inhaler     fluticasone-vilanterol (BREO ELLIPTA) 200-25 MCG/ACT inhaler     folic acid (FOLVITE) 1 MG tablet     hydroxychloroquine (PLAQUENIL) 200 MG tablet     levothyroxine (SYNTHROID/LEVOTHROID) 200 MCG tablet     lisinopril (ZESTRIL) 5 MG tablet     predniSONE (DELTASONE) 10 MG tablet     predniSONE (DELTASONE) 20 MG tablet     sulfamethoxazole-trimethoprim (BACTRIM DS) 800-160 MG tablet     sulfaSALAzine (AZULFIDINE) 500 MG tablet     No current facility-administered medications for this visit.     No Known Allergies  Past Medical History:   Diagnosis Date     ILD (interstitial lung disease) (H)     RA ILD dx 2022     RA (rheumatoid arthritis) (H)     dx 2003; 2022 +RF and CCP, but initially seroneg; methotrexate [id number]       No past surgical history on file.    Social History     Socioeconomic History     Marital status:      Spouse name: Not on file     Number of children: Not on file     Years of education: Not on file     Highest education level: Not on file   Occupational History     Not on file   Tobacco Use     Smoking status: Never     Smokeless tobacco: Never   Vaping Use     Vaping status: Not on file   Substance and Sexual Activity     Alcohol use: Yes     Drug use: Never     Sexual activity: Not on file   Other Topics Concern     Not on file   Social History Narrative    .  .        ILD exposure questions:    Occupation:     Asbestos: no    Silica: no    Mold: no    Pet bird: no    Hot tub: no    Portable humidifier: no    Brass or woodwind instruments: not since high school    Smoking tobacco or marijuana: no, no vaping    Feather pillow/blanket: no     Gardening: yes    Hobbies: walking, gardening, reading, fishing, travelling    Chemotherapy or radiation therapy: no    Fam hx of ILD: no      Social Determinants of Health     Financial Resource Strain: Not on file   Food Insecurity: Not on file   Transportation Needs: Not on file   Physical Activity: Not on file   Stress: Not on file   Social Connections: Not on file   Intimate Partner Violence: Not on file   Housing Stability: Not on file         Exam:   General: A&Ox4; well-appearing; in no acute distress.  HEENT: NC/AT. EOMI. No scleral icterus. No rhinorrhea. Neck supple.  CV: RRR; no M/R/G. No LE edema noted. Radial pulses 2+ bilaterally.    Pulmonary: Symmetric chest movement. Thin crackles in the bases with no squeaks auscultated  SKIN: Overall warm and dry. No rashes or bruises noted.  Neuro: Cranial nerves II-XII grossly intact.  Normal gait.  Psych: Mood good with congruent affect.    Results:  Recent Results (from the past 168 hour(s))   6 minute walk test    Collection Time: 04/13/23 12:00 AM   Result Value Ref Range    6 min walk (FT) 1,215 1,358 ft    6 Min Walk (M) 370 414 m   General PFT Lab (Please always keep checked)    Collection Time: 04/13/23  8:18 AM   Result Value Ref Range    FVC-Pred 3.27 L    FVC-Pre 2.85 L    FVC-%Pred-Pre 87 %    FEV1-Pre 2.66 L    FEV1-%Pred-Pre 101 %    FEV1FVC-Pred 81 %    FEV1FVC-Pre 93 %    FEFMax-Pred 6.86 L/sec    FEFMax-Pre 9.36 L/sec    FEFMax-%Pred-Pre 136 %    FEF2575-Pred 2.54 L/sec    FEF2575-Pre 5.61 L/sec    FGM2280-%Pred-Pre 221 %    ExpTime-Pre 5.84 sec    FIFMax-Pre 3.80 L/sec    VC-Pred 3.61 L    VC-Pre 3.05 L    VC-%Pred-Pre 84 %    IC-Pred 2.89 L    IC-Pre 2.10 L    IC-%Pred-Pre 72 %    ERV-Pred 0.71 L    ERV-Pre 0.96 L    ERV-%Pred-Pre 134 %    FEV1FEV6-Pred 82 %    FEV1FEV6-Pre 93 %    FRCPleth-Pred 2.78 L    FRCPleth-Pre 2.06 L    FRCPleth-%Pred-Pre 73 %    RVPleth-Pred 1.86 L    RVPleth-Pre 1.10 L    RVPleth-%Pred-Pre 59 %    TLCPleth-Pred 5.19  L    TLCPleth-Pre 4.15 L    TLCPleth-%Pred-Pre 80 %    DLCOunc-Pred 21.87 ml/min/mmHg    DLCOunc-Pre 14.22 ml/min/mmHg    DLCOunc-%Pred-Pre 64 %    VA-Pre 3.95 L    VA-%Pred-Pre 76 %    FEV1SVC-Pred 73 %    FEV1SVC-Pre 87 %          FVC FEV1 TLC DLCO   12- [facility] 2.54 71% 2.31 81% 4.01 73% 11.14 52%   4- [facility] 2.85 87% 2.66 101% 4.15 80% 14.22 64%        6MWT: 370 m, no desaturations. 3 months prior was 381 m.   PFTs showed improvement in FVC from 2.54 to 2.85, FEV1 from 2.31 to 2.66. 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   # High risk immunosuppressive drug monitoring  PFTs indicate improvement with increase in DLCO, with corresponding functional capacity improvement since starting steroids and breo-ellipta. Tolerated prednisone taper well, and currently doing well on prednisone 10 mg daily. Improvement on steroid therapy indicates a responsive inflammatory process, and would prefer to be on steroid sparing agents such as mycophenolate. May or may not respond to new addition of abatacept in rheumatoid arthritis regimen, though there is limited data to show its effect in ILD. Will see how she does with continued maintenance prednisone while mycophenolate taper up is taking effect, and while abatacept is on board.   - Mycophenolate 500 mg BID then 500/1000 mg, then 1000 mg BID per taper up plan  - 1 month follow up CBC and CMP for mycophenolate drug monitoring, baseline Cr and LFTs ok  - Continue prednisone 10 mg daily for 3 months, with taper plan at follow up  - Continue abacept through rheumatology   - Continue breo-ellipta daily for bronchiolitis  - Follow up in 3 months with full PFTs, high res CT, and CMP/CBC    # Postnasal drip, throat clearing  - Recommended  over the counter fluticasone spray and/or saline rinses    Staffed with Dr. Cherelle Cook MD  Internal Medicine, PGY-3  Pager: [phone]      I saw and evaluated patient with Resident.  Case discussed - agree with note.  I reviewed PFT: mild restriction with a moderate diffusion defect, improved compared to 4 mo ago (per my table above).  Overall, PFT is improved.  I also reviewed 6MWT: Reduced walk distance without exertional hypoxemia on room air.     Will add MMF as a steroid-sparing agent for the RA ILD.  Nintedanib does not have anti-inflammatory properties and will not be steroid-sparing.  Will cont same dose prednisone while we ramp up MMF dosing.  Cont bactrim for PJP prophylaxis.  Will get labs in 1 month to monitor MMF, then at follow up.  Will get f/u HRCT at follow up to assess response to therapy.  Advised patient to avoid sick contacts.    I spent 36 minutes reviewing chart, talking with and examining patient, reviewing test results, formulating plan and documentation on the day of the encounter (excluding PFT review).      JIMENEZ MEJIAS M.D.

## 2023-04-13 NOTE — NURSING NOTE
Met with patient and reviewed new medication Cellcept. Pt was also given information via [employer] and through [employer]. Pt will have follow up labs drawn at [facility] faxed [phone]. All questions answered. Pt has contact information if further questions.   Faiza Obrien RN BSN

## 2023-04-13 NOTE — PATIENT INSTRUCTIONS
"Good afternoon *       1) Mycophenolate, also known as \"Cellcept\"  You will take increasing doses of this until you are at a final \"goal\" dose of 1000 mg twice per day (total of 2000 mg daily). This medication should be taken on an empty stomach, ideally 2 hours before you take your OFEV.    The ramping up will look like this:  Take 500 mg (1 tab) twice daily for 2 weeks,   THEN 500 mg in the AM and 1000 mg in the PM for 2 weeks,   THEN 1000 mg twice daily thereafter     Continue taking Bactrim  This medication helps to prevent acquiring a common strain of pneumonia that can occur when you are immunocompromised (as you will be while taking Cellcept and higher doses of prednisone)  You will take a single tablet of Bactrim daily.     I sent the above prescriptions to [facility].      Finally, with suppressing your immune system, it is important to keep an eye on your blood counts. We would like for you to have your blood drawn in one month after starting your medication. We will check this about every three months thereafter.      I will fax orders to the [facility] in [place]. Please let us know when you have had these done.   "

## 2023-04-13 NOTE — LETTER
4/13/2023         RE: Shelby Bennett  [address] [address]        Dear Colleague,    Thank you for referring your patient, Shelby Bennett, to the [facility] FOR LUNG SCIENCE AND [facility] [place]. Please see a copy of my visit note below.    Reason for Visit  Shelby Bennett is a 52 year old year old female who is being seen for No chief complaint on file.    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     She was seen as a new consultation in 12/2022 for evaluation of worsening exertional dyspnea and concern for interstitial lung disease in the setting of her rheuamtoid arthritis. She initially saw Dr. Coats at [facility] in [place] who obtained a high resolution CT showing mosaic attenuation and air trapping, traction bronchiectasis, peribronchovascular distribution of groundglass and reticular opacities not in a UIP pattern. Her PFTs showed mild restriction with moderate diffusion defect. She was recommended to continue her prior started prednisone 40 mg daily due to evidence of inflammation on her CT, with a taper plan down to 10 mg daily which she is currently on. She was also started on Breo ellipta for her bronchiolitis. Echocardiogram was obtained to evaluate for pulmonary hypertension as her PFT showed a diffusing capacity decreased out of proportion to her FVC.  This was normal without evidence of pulmonary hypertension.    Today, she states that she feels perhaps a bit better as compared to last visit. She still gets winded with walking up a long two flights of stairs at the [facility] with a somewhat heavy bag. Is still able to get 10,000 steps a day. Overall states that she is not quite moving as fast as 1 year ago, however, she has had improvement overall since starting  prednisone. Continues to need to clear her throat frequently with some phlegm. Cough is periodic and worse in summer and fall. More rarely, she will cough up greenish sputum. Does note that she will get cramps in her legs at night. Some mild snoring, which is new since weight gain with prednisone. No worsening daytime fatigue.    Recently started abatacept (Orencia) for rheumatoid arthritis. Has been on this for 5 weeks.           Current Outpatient Medications   Medication     cholecalciferol (VITAMIN D3) 25 mcg (1000 units) capsule     fluticasone-vilanterol (BREO ELLIPTA) 100-25 MCG/ACT inhaler     fluticasone-vilanterol (BREO ELLIPTA) 200-25 MCG/ACT inhaler     folic acid (FOLVITE) 1 MG tablet     hydroxychloroquine (PLAQUENIL) 200 MG tablet     levothyroxine (SYNTHROID/LEVOTHROID) 200 MCG tablet     lisinopril (ZESTRIL) 5 MG tablet     predniSONE (DELTASONE) 10 MG tablet     predniSONE (DELTASONE) 20 MG tablet     sulfamethoxazole-trimethoprim (BACTRIM DS) 800-160 MG tablet     sulfaSALAzine (AZULFIDINE) 500 MG tablet     No current facility-administered medications for this visit.     No Known Allergies  Past Medical History:   Diagnosis Date     ILD (interstitial lung disease) (H)     RA ILD dx 2022     RA (rheumatoid arthritis) (H)     dx 2003; 2022 +RF and CCP, but initially seroneg; methotrexate [id number]       No past surgical history on file.    Social History     Socioeconomic History     Marital status:      Spouse name: Not on file     Number of children: Not on file     Years of education: Not on file     Highest education level: Not on file   Occupational History     Not on file   Tobacco Use     Smoking status: Never     Smokeless tobacco: Never   Vaping Use     Vaping status: Not on file   Substance and Sexual Activity     Alcohol use: Yes     Drug use: Never     Sexual activity: Not on file   Other Topics Concern     Not on file   Social History Narrative    .  .        ILD  exposure questions:    Occupation:     Asbestos: no    Silica: no    Mold: no    Pet bird: no    Hot tub: no    Portable humidifier: no    Brass or woodwind instruments: not since high school    Smoking tobacco or marijuana: no, no vaping    Feather pillow/blanket: no    Gardening: yes    Hobbies: walking, gardening, reading, fishing, travelling    Chemotherapy or radiation therapy: no    Fam hx of ILD: no      Social Determinants of Health     Financial Resource Strain: Not on file   Food Insecurity: Not on file   Transportation Needs: Not on file   Physical Activity: Not on file   Stress: Not on file   Social Connections: Not on file   Intimate Partner Violence: Not on file   Housing Stability: Not on file         Exam:   General: A&Ox4; well-appearing; in no acute distress.  HEENT: NC/AT. EOMI. No scleral icterus. No rhinorrhea. Neck supple.  CV: RRR; no M/R/G. No LE edema noted. Radial pulses 2+ bilaterally.    Pulmonary: Symmetric chest movement. Thin crackles in the bases with no squeaks auscultated  SKIN: Overall warm and dry. No rashes or bruises noted.  Neuro: Cranial nerves II-XII grossly intact.  Normal gait.  Psych: Mood good with congruent affect.    Results:  Recent Results (from the past 168 hour(s))   6 minute walk test    Collection Time: 04/13/23 12:00 AM   Result Value Ref Range    6 min walk (FT) 1,215 1,358 ft    6 Min Walk (M) 370 414 m   General PFT Lab (Please always keep checked)    Collection Time: 04/13/23  8:18 AM   Result Value Ref Range    FVC-Pred 3.27 L    FVC-Pre 2.85 L    FVC-%Pred-Pre 87 %    FEV1-Pre 2.66 L    FEV1-%Pred-Pre 101 %    FEV1FVC-Pred 81 %    FEV1FVC-Pre 93 %    FEFMax-Pred 6.86 L/sec    FEFMax-Pre 9.36 L/sec    FEFMax-%Pred-Pre 136 %    FEF2575-Pred 2.54 L/sec    FEF2575-Pre 5.61 L/sec    JXH1372-%Pred-Pre 221 %    ExpTime-Pre 5.84 sec    FIFMax-Pre 3.80 L/sec    VC-Pred 3.61 L    VC-Pre 3.05 L    VC-%Pred-Pre 84 %    IC-Pred 2.89 L    IC-Pre 2.10 L     IC-%Pred-Pre 72 %    ERV-Pred 0.71 L    ERV-Pre 0.96 L    ERV-%Pred-Pre 134 %    FEV1FEV6-Pred 82 %    FEV1FEV6-Pre 93 %    FRCPleth-Pred 2.78 L    FRCPleth-Pre 2.06 L    FRCPleth-%Pred-Pre 73 %    RVPleth-Pred 1.86 L    RVPleth-Pre 1.10 L    RVPleth-%Pred-Pre 59 %    TLCPleth-Pred 5.19 L    TLCPleth-Pre 4.15 L    TLCPleth-%Pred-Pre 80 %    DLCOunc-Pred 21.87 ml/min/mmHg    DLCOunc-Pre 14.22 ml/min/mmHg    DLCOunc-%Pred-Pre 64 %    VA-Pre 3.95 L    VA-%Pred-Pre 76 %    FEV1SVC-Pred 73 %    FEV1SVC-Pre 87 %          FVC FEV1 TLC DLCO   12- [facility] 2.54 71% 2.31 81% 4.01 73% 11.14 52%   4- [facility] 2.85 87% 2.66 101% 4.15 80% 14.22 64%        6MWT: 370 m, no desaturations. 3 months prior was 381 m.   PFTs showed improvement in FVC from 2.54 to 2.85, FEV1 from 2.31 to 2.66. 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   # High risk immunosuppressive drug monitoring  PFTs indicate improvement with increase in DLCO, with corresponding functional capacity improvement since starting steroids and breo-ellipta. Tolerated prednisone taper well, and currently doing well on prednisone 10 mg daily. Improvement on steroid therapy indicates a responsive inflammatory process, and would prefer to be on steroid sparing agents such as mycophenolate. May or may not respond to new addition of abatacept in rheumatoid arthritis regimen, though there is limited data to show its effect in ILD. Will see how she does with continued maintenance prednisone while mycophenolate taper up is taking effect, and while abatacept is on board.   - Mycophenolate 500 mg BID then 500/1000 mg, then 1000 mg BID per taper up plan  - 1 month follow up CBC and CMP for mycophenolate drug monitoring, baseline Cr and LFTs  ok  - Continue prednisone 10 mg daily for 3 months, with taper plan at follow up  - Continue abacept through rheumatology   - Continue breo-ellipta daily for bronchiolitis  - Follow up in 3 months with full PFTs, high res CT, and CMP/CBC    # Postnasal drip, throat clearing  - Recommended over the counter fluticasone spray and/or saline rinses    Staffed with Dr. Cherelle Cook MD  Internal Medicine, PGY-3  Pager: [phone]      I saw and evaluated patient with Resident.  Case discussed - agree with note.  I reviewed PFT: mild restriction with a moderate diffusion defect, improved compared to 4 mo ago (per my table above).  Overall, PFT is improved.  I also reviewed 6MWT: Reduced walk distance without exertional hypoxemia on room air.     Will add MMF as a steroid-sparing agent for the RA ILD.  Nintedanib does not have anti-inflammatory properties and will not be steroid-sparing.  Will cont same dose prednisone while we ramp up MMF dosing.  Cont bactrim for PJP prophylaxis.  Will get labs in 1 month to monitor MMF, then at follow up.  Will get f/u HRCT at follow up to assess response to therapy.  Advised patient to avoid sick contacts.    I spent 36 minutes reviewing chart, talking with and examining patient, reviewing test results, formulating plan and documentation on the day of the encounter (excluding PFT review).      MARIA ANTONIA MEJIAS M.D.        Again, thank you for allowing me to participate in the care of your patient.        Sincerely,        Maria Antonia Mejias MD

## 2023-04-14 LAB
DLCOUNC-%PRED-PRE: 64 %
DLCOUNC-PRE: 14.22 ML/MIN/MMHG
DLCOUNC-PRED: 21.87 ML/MIN/MMHG
ERV-%PRED-PRE: 134 %
ERV-PRE: 0.96 L
ERV-PRED: 0.71 L
EXPTIME-PRE: 5.84 SEC
FEF2575-%PRED-PRE: 221 %
FEF2575-PRE: 5.61 L/SEC
FEF2575-PRED: 2.54 L/SEC
FEFMAX-%PRED-PRE: 136 %
FEFMAX-PRE: 9.36 L/SEC
FEFMAX-PRED: 6.86 L/SEC
FEV1-%PRED-PRE: 101 %
FEV1-PRE: 2.66 L
FEV1FEV6-PRE: 93 %
FEV1FEV6-PRED: 82 %
FEV1FVC-PRE: 93 %
FEV1FVC-PRED: 81 %
FEV1SVC-PRE: 87 %
FEV1SVC-PRED: 73 %
FIFMAX-PRE: 3.8 L/SEC
FRCPLETH-%PRED-PRE: 73 %
FRCPLETH-PRE: 2.06 L
FRCPLETH-PRED: 2.78 L
FVC-%PRED-PRE: 87 %
FVC-PRE: 2.85 L
FVC-PRED: 3.27 L
IC-%PRED-PRE: 72 %
IC-PRE: 2.1 L
IC-PRED: 2.89 L
RVPLETH-%PRED-PRE: 59 %
RVPLETH-PRE: 1.1 L
RVPLETH-PRED: 1.86 L
TLCPLETH-%PRED-PRE: 80 %
TLCPLETH-PRE: 4.15 L
TLCPLETH-PRED: 5.19 L
VA-%PRED-PRE: 76 %
VA-PRE: 3.95 L
VC-%PRED-PRE: 84 %
VC-PRE: 3.05 L
VC-PRED: 3.61 L

## 2023-04-20 ENCOUNTER — TRANSFERRED RECORDS (OUTPATIENT)
Dept: HEALTH INFORMATION MANAGEMENT | Facility: CLINIC | Age: 53
End: 2023-04-20
Payer: COMMERCIAL

## 2023-04-20 LAB
ALT SERPL-CCNC: 21 IU/L (ref 5–35)
AST SERPL-CCNC: 25 U/L (ref 5–34)
CREATININE (EXTERNAL): 0.86 MG/DL (ref 0.5–1.3)
GFR ESTIMATED (EXTERNAL): NORMAL ML/MIN/1.73M2
GFR ESTIMATED (IF AFRICAN AMERICAN) (EXTERNAL): NORMAL ML/MIN/1.73M2

## 2023-04-21 ENCOUNTER — TELEPHONE (OUTPATIENT)
Facility: CLINIC | Age: 53
End: 2023-04-21
Payer: COMMERCIAL

## 2023-04-21 NOTE — TELEPHONE ENCOUNTER
Returned call.  Fax received with lab results (only).  Additional fax to be coming to ILD Clinic with provider notes (f. [phone])    Desiree Buchanan RN, BSN  ILD Nurse Care Coordinator  (P) [phone]

## 2023-04-21 NOTE — TELEPHONE ENCOUNTER
[facility] Call Center    Phone Message    May a detailed message be left on voicemail: yes     Reason for Call: Other: Please call Radha at [facility] to confirm fax regarding records and labs received of a mutual patient.      Action Taken: Other: Pulm    Travel Screening: Not Applicable

## 2023-07-14 ENCOUNTER — ANCILLARY PROCEDURE (OUTPATIENT)
Dept: CT IMAGING | Facility: CLINIC | Age: 53
End: 2023-07-14
Attending: INTERNAL MEDICINE
Payer: COMMERCIAL

## 2023-07-14 ENCOUNTER — LAB (OUTPATIENT)
Dept: LAB | Facility: CLINIC | Age: 53
End: 2023-07-14
Attending: INTERNAL MEDICINE
Payer: COMMERCIAL

## 2023-07-14 ENCOUNTER — OFFICE VISIT (OUTPATIENT)
Dept: PULMONOLOGY | Facility: CLINIC | Age: 53
End: 2023-07-14
Attending: INTERNAL MEDICINE
Payer: COMMERCIAL

## 2023-07-14 VITALS
OXYGEN SATURATION: 95 % | HEIGHT: 69 IN | HEART RATE: 67 BPM | RESPIRATION RATE: 17 BRPM | DIASTOLIC BLOOD PRESSURE: 84 MMHG | SYSTOLIC BLOOD PRESSURE: 132 MMHG | BODY MASS INDEX: 27.91 KG/M2

## 2023-07-14 DIAGNOSIS — J84.9 INTERSTITIAL LUNG DISEASE (H): ICD-10-CM

## 2023-07-14 DIAGNOSIS — M06.9 RHEUMATOID ARTHRITIS, INVOLVING UNSPECIFIED SITE, UNSPECIFIED WHETHER RHEUMATOID FACTOR PRESENT (H): ICD-10-CM

## 2023-07-14 DIAGNOSIS — J84.9 ILD (INTERSTITIAL LUNG DISEASE) (H): ICD-10-CM

## 2023-07-14 DIAGNOSIS — J84.9 ILD (INTERSTITIAL LUNG DISEASE) (H): Primary | ICD-10-CM

## 2023-07-14 DIAGNOSIS — J21.9 BRONCHIOLITIS: ICD-10-CM

## 2023-07-14 LAB
ALBUMIN SERPL BCG-MCNC: 4.2 G/DL (ref 3.5–5.2)
ALP SERPL-CCNC: 45 U/L (ref 35–104)
ALT SERPL W P-5'-P-CCNC: 13 U/L (ref 0–50)
ANION GAP SERPL CALCULATED.3IONS-SCNC: 8 MMOL/L (ref 7–15)
AST SERPL W P-5'-P-CCNC: 19 U/L (ref 0–45)
BASOPHILS # BLD AUTO: 0.1 10E3/UL (ref 0–0.2)
BASOPHILS NFR BLD AUTO: 2 %
BILIRUB SERPL-MCNC: 0.3 MG/DL
BUN SERPL-MCNC: 12.8 MG/DL (ref 6–20)
CALCIUM SERPL-MCNC: 9.3 MG/DL (ref 8.6–10)
CHLORIDE SERPL-SCNC: 103 MMOL/L (ref 98–107)
CREAT SERPL-MCNC: 0.8 MG/DL (ref 0.51–0.95)
DEPRECATED HCO3 PLAS-SCNC: 28 MMOL/L (ref 22–29)
EOSINOPHIL # BLD AUTO: 0.2 10E3/UL (ref 0–0.7)
EOSINOPHIL NFR BLD AUTO: 2 %
ERYTHROCYTE [DISTWIDTH] IN BLOOD BY AUTOMATED COUNT: 12.8 % (ref 10–15)
GFR SERPL CREATININE-BSD FRML MDRD: 87 ML/MIN/1.73M2
GLUCOSE SERPL-MCNC: 91 MG/DL (ref 70–99)
HCT VFR BLD AUTO: 43.5 % (ref 35–47)
HGB BLD-MCNC: 13.9 G/DL (ref 11.7–15.7)
IMM GRANULOCYTES # BLD: 0.2 10E3/UL
IMM GRANULOCYTES NFR BLD: 2 %
LYMPHOCYTES # BLD AUTO: 1.7 10E3/UL (ref 0.8–5.3)
LYMPHOCYTES NFR BLD AUTO: 18 %
MCH RBC QN AUTO: 31.6 PG (ref 26.5–33)
MCHC RBC AUTO-ENTMCNC: 32 G/DL (ref 31.5–36.5)
MCV RBC AUTO: 99 FL (ref 78–100)
MONOCYTES # BLD AUTO: 0.9 10E3/UL (ref 0–1.3)
MONOCYTES NFR BLD AUTO: 10 %
NEUTROPHILS # BLD AUTO: 6.3 10E3/UL (ref 1.6–8.3)
NEUTROPHILS NFR BLD AUTO: 66 %
NRBC # BLD AUTO: 0 10E3/UL
NRBC BLD AUTO-RTO: 0 /100
PLATELET # BLD AUTO: 308 10E3/UL (ref 150–450)
POTASSIUM SERPL-SCNC: 3.9 MMOL/L (ref 3.4–5.3)
PROT SERPL-MCNC: 6.8 G/DL (ref 6.4–8.3)
RBC # BLD AUTO: 4.4 10E6/UL (ref 3.8–5.2)
SODIUM SERPL-SCNC: 139 MMOL/L (ref 136–145)
WBC # BLD AUTO: 9.4 10E3/UL (ref 4–11)

## 2023-07-14 PROCEDURE — 94729 DIFFUSING CAPACITY: CPT | Performed by: INTERNAL MEDICINE

## 2023-07-14 PROCEDURE — 36415 COLL VENOUS BLD VENIPUNCTURE: CPT | Performed by: PATHOLOGY

## 2023-07-14 PROCEDURE — 94726 PLETHYSMOGRAPHY LUNG VOLUMES: CPT | Performed by: INTERNAL MEDICINE

## 2023-07-14 PROCEDURE — 99213 OFFICE O/P EST LOW 20 MIN: CPT | Performed by: INTERNAL MEDICINE

## 2023-07-14 PROCEDURE — 99215 OFFICE O/P EST HI 40 MIN: CPT | Mod: 25 | Performed by: INTERNAL MEDICINE

## 2023-07-14 PROCEDURE — 80053 COMPREHEN METABOLIC PANEL: CPT | Performed by: PATHOLOGY

## 2023-07-14 PROCEDURE — 85025 COMPLETE CBC W/AUTO DIFF WBC: CPT | Performed by: PATHOLOGY

## 2023-07-14 PROCEDURE — 94375 RESPIRATORY FLOW VOLUME LOOP: CPT | Performed by: INTERNAL MEDICINE

## 2023-07-14 PROCEDURE — 71250 CT THORAX DX C-: CPT | Mod: GC | Performed by: RADIOLOGY

## 2023-07-14 PROCEDURE — 99417 PROLNG OP E/M EACH 15 MIN: CPT | Performed by: INTERNAL MEDICINE

## 2023-07-14 RX ORDER — PREDNISONE 1 MG/1
TABLET ORAL
Qty: 360 TABLET | Refills: 4 | Status: SHIPPED | OUTPATIENT
Start: 2023-07-14 | End: 2023-09-06

## 2023-07-14 RX ORDER — PREDNISONE 5 MG/1
TABLET ORAL
Qty: 90 TABLET | Refills: 4 | Status: SHIPPED | OUTPATIENT
Start: 2023-07-14 | End: 2023-09-06

## 2023-07-14 ASSESSMENT — PAIN SCALES - GENERAL: PAINLEVEL: NO PAIN (0)

## 2023-07-14 NOTE — LETTER
"    7/14/2023         RE: Shelby Bennett  [address] [address] [address]        Dear Colleague,    Thank you for referring your patient, Shelby Bennett, to the [facility] FOR LUNG SCIENCE AND [facility] [place]. Please see a copy of my visit note below.    [facility] Interstitial Lung Disease Clinic    Reason for Visit  Shelby Bennett is a 53 year old year old female who is being seen for RECHECK    HPI  Shelby Bennett is a 52 yo woman with rheumatoid arthritis and associated interstitial lung disease (NSIP) with bronchiolitis who is here for follow-up.  She presented in 12/2022 for evaluation of worsening exertional dyspnea and initially saw Dr. Coats at [facility] in [place] who obtained a high resolution CT showing mosaic attenuation and air trapping, traction bronchiectasis, peribronchovascular distribution of groundglass and reticular opacities.   She was started on prednisone 40 mg daily.  She was seen in our ILD clinic in December 2022, and we started a prednisone taper for her rheumatoid arthritis ILD and started Breo ellipta for her bronchiolitis.  I started mycophenolate 3 months ago to treat her rheumatoid arthritis ILD and also as a steroid sparing agent.    Today, Shelby reports that she continues to have dyspnea on exertion that is stable.  It is improved compared to last December, but her breathing is not at her prior baseline.  For example, she does feel short of breath if she walks up 2 flights of stairs or walks uphill.  She is able to walk up to 2 miles.  She endorses \"a little bit\" coughing.  She denies paroxysmal nocturnal dyspnea or rash.  She does notice a little wheezing occasionally at nighttime.  She is on Orencia for her rheumatoid arthritis, and it appears to be have helped her joints.  She is tolerating mycophenolate and is currently taking 1000 mg twice daily.  Her prednisone dose currently is 10 mg daily.    For her rheumatoid arthritis, she takes " Orencia, sulfasalazine and hydroxychloroquine.  She does endorse clearing her throat frequently.          Current Outpatient Medications   Medication     cholecalciferol (VITAMIN D3) 25 mcg (1000 units) capsule     fluticasone-vilanterol (BREO ELLIPTA) 200-25 MCG/ACT inhaler     hydroxychloroquine (PLAQUENIL) 200 MG tablet     levothyroxine (SYNTHROID/LEVOTHROID) 200 MCG tablet     lisinopril (ZESTRIL) 5 MG tablet     mycophenolate (GENERIC EQUIVALENT) 500 MG tablet     ORENCIA CLICKJECT 125 MG/ML SOAJ auto-injector     predniSONE (DELTASONE) 1 MG tablet     predniSONE (DELTASONE) 20 MG tablet     predniSONE (DELTASONE) 5 MG tablet     sulfamethoxazole-trimethoprim (BACTRIM DS) 800-160 MG tablet     sulfaSALAzine (AZULFIDINE) 500 MG tablet     predniSONE (DELTASONE) 10 MG tablet     No current facility-administered medications for this visit.     No Known Allergies  Past Medical History:   Diagnosis Date     ILD (interstitial lung disease) (H)     RA ILD dx 2022     RA (rheumatoid arthritis) (H)     dx 2003; 2022 +RF and CCP, but initially seroneg; methotrexate [id number]       No past surgical history on file.    Social History     Socioeconomic History     Marital status:      Spouse name: Not on file     Number of children: Not on file     Years of education: Not on file     Highest education level: Not on file   Occupational History     Not on file   Tobacco Use     Smoking status: Never     Smokeless tobacco: Never   Substance and Sexual Activity     Alcohol use: Yes     Drug use: Never     Sexual activity: Not on file   Other Topics Concern     Not on file   Social History Narrative    .  .        ILD exposure questions:    Occupation:     Asbestos: no    Silica: no    Mold: no    Pet bird: no    Hot tub: no    Portable humidifier: no    Brass or woodwind instruments: not since high school    Smoking tobacco or marijuana: no, no vaping    Feather pillow/blanket: no    Gardening:  "yes    Hobbies: walking, gardening, reading, fishing, travelling    Chemotherapy or radiation therapy: no    Fam hx of ILD: no      Social Determinants of Health     Financial Resource Strain: Not on file   Food Insecurity: Not on file   Transportation Needs: Not on file   Physical Activity: Not on file   Stress: Not on file   Social Connections: Not on file   Intimate Partner Violence: Not on file   Housing Stability: Not on file       Family History   Problem Relation Age of Onset     Rheumatoid Arthritis Sister      Interstitial Lung Disease No family hx of            Vitals: /84   Pulse 67   Resp 17   Ht 1.753 m (5' 9\")   SpO2 95%   BMI 27.91 kg/m      Exam:   GENERAL APPEARANCE: Well developed, well nourished, alert, and in no apparent distress.  RESP: good air flow throughout.  Bibasilar inspiratory crackles. No rhonchi. No wheezes.  + rare inspiratory squeak.  CV: Normal S1, S2, regular rhythm, normal rate. No murmur.  No LE edema.   MS: extremities normal. No clubbing. No cyanosis.  SKIN: no rash on limited exam.  NEURO: Mentation intact, speech normal, normal gait and stance.  PSYCH: mentation appears normal. and affect normal/bright.    Results:  Recent Results (from the past 168 hour(s))   General PFT Lab (Please always keep checked)    Collection Time: 07/14/23  7:40 AM   Result Value Ref Range    FVC-Pred 3.24 L    FVC-Pre 2.79 L    FVC-%Pred-Pre 86 %    FEV1-Pre 2.61 L    FEV1-%Pred-Pre 100 %    FEV1FVC-Pred 81 %    FEV1FVC-Pre 94 %    FEFMax-Pred 6.82 L/sec    FEFMax-Pre 9.39 L/sec    FEFMax-%Pred-Pre 137 %    FEF2575-Pred 2.49 L/sec    FEF2575-Pre 5.45 L/sec    MQU7693-%Pred-Pre 218 %    ExpTime-Pre 6.56 sec    FIFMax-Pre 4.85 L/sec    VC-Pred 3.78 L    VC-Pre 2.87 L    VC-%Pred-Pre 75 %    IC-Pred 2.66 L    IC-Pre 1.88 L    IC-%Pred-Pre 70 %    ERV-Pred 1.01 L    ERV-Pre 0.99 L    ERV-%Pred-Pre 98 %    FEV1FEV6-Pred 82 %    FEV1FEV6-Pre 94 %    DLCOunc-Pred 21.04 ml/min/mmHg    " DLCOunc-Pre 13.45 ml/min/mmHg    DLCOunc-%Pred-Pre 63 %    VA-Pre 3.88 L    VA-%Pred-Pre 77 %    FEV1SVC-Pred 69 %    FEV1SVC-Pre 91 %   Comprehensive metabolic panel    Collection Time: 07/14/23  8:29 AM   Result Value Ref Range    Sodium 139 136 - 145 mmol/L    Potassium 3.9 3.4 - 5.3 mmol/L    Chloride 103 98 - 107 mmol/L    Carbon Dioxide (CO2) 28 22 - 29 mmol/L    Anion Gap 8 7 - 15 mmol/L    Urea Nitrogen 12.8 6.0 - 20.0 mg/dL    Creatinine 0.80 0.51 - 0.95 mg/dL    Calcium 9.3 8.6 - 10.0 mg/dL    Glucose 91 70 - 99 mg/dL    Alkaline Phosphatase 45 35 - 104 U/L    AST 19 0 - 45 U/L    ALT 13 0 - 50 U/L    Protein Total 6.8 6.4 - 8.3 g/dL    Albumin 4.2 3.5 - 5.2 g/dL    Bilirubin Total 0.3 <=1.2 mg/dL    GFR Estimate 87 >60 mL/min/1.73m2   CBC with platelets and differential    Collection Time: 07/14/23  8:29 AM   Result Value Ref Range    WBC Count 9.4 4.0 - 11.0 10e3/uL    RBC Count 4.40 3.80 - 5.20 10e6/uL    Hemoglobin 13.9 11.7 - 15.7 g/dL    Hematocrit 43.5 35.0 - 47.0 %    MCV 99 78 - 100 fL    MCH 31.6 26.5 - 33.0 pg    MCHC 32.0 31.5 - 36.5 g/dL    RDW 12.8 10.0 - 15.0 %    Platelet Count 308 150 - 450 10e3/uL    % Neutrophils 66 %    % Lymphocytes 18 %    % Monocytes 10 %    % Eosinophils 2 %    % Basophils 2 %    % Immature Granulocytes 2 %    NRBCs per 100 WBC 0 <1 /100    Absolute Neutrophils 6.3 1.6 - 8.3 10e3/uL    Absolute Lymphocytes 1.7 0.8 - 5.3 10e3/uL    Absolute Monocytes 0.9 0.0 - 1.3 10e3/uL    Absolute Eosinophils 0.2 0.0 - 0.7 10e3/uL    Absolute Basophils 0.1 0.0 - 0.2 10e3/uL    Absolute Immature Granulocytes 0.2 <=0.4 10e3/uL    Absolute NRBCs 0.0 10e3/uL         FVC FEV1 TLC DLCO   12- [facility] 2.54 71% 2.31 81% 4.01 73% 11.14 52%   4- [facility] 2.85 87% 2.66 101% 4.15 80% 14.22 64%   7- 2.79 86% 2.61 100% 4.40 80% 13.45 63%        I reviewed pulmonary function test that was performed today.  This shows mild restriction with a moderate diffusion defect.  Lung  function is stable and overall improved compared to December 2022.  I also reviewed labs today, and they are within normal limits.    I also reviewed high-resolution chest CT from today.  This shows stable ILD with reticular opacities and traction bronchiectasis, and there has been a decrease in bilateral patchy small groundglass opacities compared to December 2022.    I reviewed results with the patient.      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   1.  Rheumatoid arthritis ILD and bronchiolitis.  She has mild pulmonary symptoms and a stable PFT with overall improvement compared to December 2022.  She has responded to prednisone and mycophenolate.  Given her stability, I will taper prednisone further.  I recommend 9 mg daily for 1 month, then 8 mg daily for 1 month, then 7 mg daily.  I gave her new prescriptions for prednisone 5 mg and 1 mg tablets.  She will continue mycophenolate 1000 mg twice a day.  I asked her to let us know if her breathing, cough or chest tightness worsens as we taper the prednisone.  I also advised her that she might feel fatigue and achiness as we decrease the prednisone dose.  She will continue Breo Ellipta inhaler.  I also encouraged her to continue physical activity and walking as she is doing.  I advised her to avoid leflunomide for her rheumatoid arthritis.  If her ILD worsens in the future, then we could consider nintedanib for fibrosis versus changing Orencia to tocilizumab.  Rituximab could be another option.  She will return in 3 months with full PFT and 6-minute walk test.  2.  High risk medication immunosuppression monitoring.  She will continue Bactrim for pneumocystis prophylaxis.  I will recheck labs in 3 months to monitor mycophenolate.  I also will check CRP.  3.  Healthcare maintenance.  I recommend that she get the COVID-19 bivalent booster.   She should hold mycophenolate 3 days before the shot and restart 7 days after the shot.  4.  Postnasal drip.  I recommended Flonase 2 sprays each nostril twice daily for 1 month.  She can stop after 1 month if there is no benefit, but she will continue if the Flonase helps.  She can decrease to once daily after 1 month if it helps the postnasal drip.  I spent 67 minutes reviewing chart, talking with and examining patient, reviewing test results, formulating plan and documentation on the day of the encounter (excluding PFT review).                  Again, thank you for allowing me to participate in the care of your patient.        Sincerely,        Maria Antonia Villarreal MD

## 2023-07-14 NOTE — PROGRESS NOTES
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  She presented in 12/2022 for evaluation of worsening exertional dyspnea and initially saw Dr. Coats at [facility] in [place] who obtained a high resolution CT showing mosaic attenuation and air trapping, traction bronchiectasis, peribronchovascular distribution of groundglass and reticular opacities.   She was started on prednisone 40 mg daily.  She was seen in our ILD clinic in December 2022, and we started a prednisone taper for her rheumatoid arthritis ILD and started Breo ellipta for her bronchiolitis.  I started mycophenolate 3 months ago to treat her rheumatoid arthritis ILD and also as a steroid sparing agent.    Today, Shelby reports that she continues to have dyspnea on exertion that is stable.  It is improved compared to last December, but her breathing is not at her prior baseline.  For example, she does feel short of breath if she walks up 2 flights of stairs or walks uphill.  She is able to walk up to 2 miles.  She endorses \"a little bit\" coughing.  She denies paroxysmal nocturnal dyspnea or rash.  She does notice a little wheezing occasionally at nighttime.  She is on Orencia for her rheumatoid arthritis, and it appears to be have helped her joints.  She is tolerating mycophenolate and is currently taking 1000 mg twice daily.  Her prednisone dose currently is 10 mg daily.    For her rheumatoid arthritis, she takes Orencia, sulfasalazine and hydroxychloroquine.  She does endorse clearing her throat frequently.          Current Outpatient Medications   Medication     cholecalciferol (VITAMIN D3) 25 mcg (1000 units) capsule     fluticasone-vilanterol (BREO ELLIPTA) 200-25 MCG/ACT inhaler     " hydroxychloroquine (PLAQUENIL) 200 MG tablet     levothyroxine (SYNTHROID/LEVOTHROID) 200 MCG tablet     lisinopril (ZESTRIL) 5 MG tablet     mycophenolate (GENERIC EQUIVALENT) 500 MG tablet     ORENCIA CLICKJECT 125 MG/ML SOAJ auto-injector     predniSONE (DELTASONE) 1 MG tablet     predniSONE (DELTASONE) 20 MG tablet     predniSONE (DELTASONE) 5 MG tablet     sulfamethoxazole-trimethoprim (BACTRIM DS) 800-160 MG tablet     sulfaSALAzine (AZULFIDINE) 500 MG tablet     predniSONE (DELTASONE) 10 MG tablet     No current facility-administered medications for this visit.     No Known Allergies  Past Medical History:   Diagnosis Date     ILD (interstitial lung disease) (H)     RA ILD dx 2022     RA (rheumatoid arthritis) (H)     dx 2003; 2022 +RF and CCP, but initially seroneg; methotrexate [id number]       No past surgical history on file.    Social History     Socioeconomic History     Marital status:      Spouse name: Not on file     Number of children: Not on file     Years of education: Not on file     Highest education level: Not on file   Occupational History     Not on file   Tobacco Use     Smoking status: Never     Smokeless tobacco: Never   Substance and Sexual Activity     Alcohol use: Yes     Drug use: Never     Sexual activity: Not on file   Other Topics Concern     Not on file   Social History Narrative    .  .        ILD exposure questions:    Occupation:     Asbestos: no    Silica: no    Mold: no    Pet bird: no    Hot tub: no    Portable humidifier: no    Brass or woodwind instruments: not since high school    Smoking tobacco or marijuana: no, no vaping    Feather pillow/blanket: no    Gardening: yes    Hobbies: walking, gardening, reading, fishing, travelling    Chemotherapy or radiation therapy: no    Fam hx of ILD: no      Social Determinants of Health     Financial Resource Strain: Not on file   Food Insecurity: Not on file   Transportation Needs: Not on file   Physical  "Activity: Not on file   Stress: Not on file   Social Connections: Not on file   Intimate Partner Violence: Not on file   Housing Stability: Not on file       Family History   Problem Relation Age of Onset     Rheumatoid Arthritis Sister      Interstitial Lung Disease No family hx of            Vitals: /84   Pulse 67   Resp 17   Ht 1.753 m (5' 9\")   SpO2 95%   BMI 27.91 kg/m      Exam:   GENERAL APPEARANCE: Well developed, well nourished, alert, and in no apparent distress.  RESP: good air flow throughout.  Bibasilar inspiratory crackles. No rhonchi. No wheezes.  + rare inspiratory squeak.  CV: Normal S1, S2, regular rhythm, normal rate. No murmur.  No LE edema.   MS: extremities normal. No clubbing. No cyanosis.  SKIN: no rash on limited exam.  NEURO: Mentation intact, speech normal, normal gait and stance.  PSYCH: mentation appears normal. and affect normal/bright.    Results:  Recent Results (from the past 168 hour(s))   General PFT Lab (Please always keep checked)    Collection Time: 07/14/23  7:40 AM   Result Value Ref Range    FVC-Pred 3.24 L    FVC-Pre 2.79 L    FVC-%Pred-Pre 86 %    FEV1-Pre 2.61 L    FEV1-%Pred-Pre 100 %    FEV1FVC-Pred 81 %    FEV1FVC-Pre 94 %    FEFMax-Pred 6.82 L/sec    FEFMax-Pre 9.39 L/sec    FEFMax-%Pred-Pre 137 %    FEF2575-Pred 2.49 L/sec    FEF2575-Pre 5.45 L/sec    BHE2666-%Pred-Pre 218 %    ExpTime-Pre 6.56 sec    FIFMax-Pre 4.85 L/sec    VC-Pred 3.78 L    VC-Pre 2.87 L    VC-%Pred-Pre 75 %    IC-Pred 2.66 L    IC-Pre 1.88 L    IC-%Pred-Pre 70 %    ERV-Pred 1.01 L    ERV-Pre 0.99 L    ERV-%Pred-Pre 98 %    FEV1FEV6-Pred 82 %    FEV1FEV6-Pre 94 %    DLCOunc-Pred 21.04 ml/min/mmHg    DLCOunc-Pre 13.45 ml/min/mmHg    DLCOunc-%Pred-Pre 63 %    VA-Pre 3.88 L    VA-%Pred-Pre 77 %    FEV1SVC-Pred 69 %    FEV1SVC-Pre 91 %   Comprehensive metabolic panel    Collection Time: 07/14/23  8:29 AM   Result Value Ref Range    Sodium 139 136 - 145 mmol/L    Potassium 3.9 3.4 - 5.3 " mmol/L    Chloride 103 98 - 107 mmol/L    Carbon Dioxide (CO2) 28 22 - 29 mmol/L    Anion Gap 8 7 - 15 mmol/L    Urea Nitrogen 12.8 6.0 - 20.0 mg/dL    Creatinine 0.80 0.51 - 0.95 mg/dL    Calcium 9.3 8.6 - 10.0 mg/dL    Glucose 91 70 - 99 mg/dL    Alkaline Phosphatase 45 35 - 104 U/L    AST 19 0 - 45 U/L    ALT 13 0 - 50 U/L    Protein Total 6.8 6.4 - 8.3 g/dL    Albumin 4.2 3.5 - 5.2 g/dL    Bilirubin Total 0.3 <=1.2 mg/dL    GFR Estimate 87 >60 mL/min/1.73m2   CBC with platelets and differential    Collection Time: 07/14/23  8:29 AM   Result Value Ref Range    WBC Count 9.4 4.0 - 11.0 10e3/uL    RBC Count 4.40 3.80 - 5.20 10e6/uL    Hemoglobin 13.9 11.7 - 15.7 g/dL    Hematocrit 43.5 35.0 - 47.0 %    MCV 99 78 - 100 fL    MCH 31.6 26.5 - 33.0 pg    MCHC 32.0 31.5 - 36.5 g/dL    RDW 12.8 10.0 - 15.0 %    Platelet Count 308 150 - 450 10e3/uL    % Neutrophils 66 %    % Lymphocytes 18 %    % Monocytes 10 %    % Eosinophils 2 %    % Basophils 2 %    % Immature Granulocytes 2 %    NRBCs per 100 WBC 0 <1 /100    Absolute Neutrophils 6.3 1.6 - 8.3 10e3/uL    Absolute Lymphocytes 1.7 0.8 - 5.3 10e3/uL    Absolute Monocytes 0.9 0.0 - 1.3 10e3/uL    Absolute Eosinophils 0.2 0.0 - 0.7 10e3/uL    Absolute Basophils 0.1 0.0 - 0.2 10e3/uL    Absolute Immature Granulocytes 0.2 <=0.4 10e3/uL    Absolute NRBCs 0.0 10e3/uL         FVC FEV1 TLC DLCO   12- [facility] 2.54 71% 2.31 81% 4.01 73% 11.14 52%   4- [facility] 2.85 87% 2.66 101% 4.15 80% 14.22 64%   7- 2.79 86% 2.61 100% 4.40 80% 13.45 63%        I reviewed pulmonary function test that was performed today.  This shows mild restriction with a moderate diffusion defect.  Lung function is stable and overall improved compared to December 2022.  I also reviewed labs today, and they are within normal limits.    I also reviewed high-resolution chest CT from today.  This shows stable ILD with reticular opacities and traction bronchiectasis, and there has been a decrease  in bilateral patchy small groundglass opacities compared to December 2022.    I reviewed results with the patient.      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   1.  Rheumatoid arthritis ILD and bronchiolitis.  She has mild pulmonary symptoms and a stable PFT with overall improvement compared to December 2022.  She has responded to prednisone and mycophenolate.  Given her stability, I will taper prednisone further.  I recommend 9 mg daily for 1 month, then 8 mg daily for 1 month, then 7 mg daily.  I gave her new prescriptions for prednisone 5 mg and 1 mg tablets.  She will continue mycophenolate 1000 mg twice a day.  I asked her to let us know if her breathing, cough or chest tightness worsens as we taper the prednisone.  I also advised her that she might feel fatigue and achiness as we decrease the prednisone dose.  She will continue Breo Ellipta inhaler.  I also encouraged her to continue physical activity and walking as she is doing.  I advised her to avoid leflunomide for her rheumatoid arthritis.  If her ILD worsens in the future, then we could consider nintedanib for fibrosis versus changing Orencia to tocilizumab.  Rituximab could be another option.  She will return in 3 months with full PFT and 6-minute walk test.  2.  High risk medication immunosuppression monitoring.  She will continue Bactrim for pneumocystis prophylaxis.  I will recheck labs in 3 months to monitor mycophenolate.  I also will check CRP.  3.  Healthcare maintenance.  I recommend that she get the COVID-19 bivalent booster.  She should hold mycophenolate 3 days before the shot and restart 7 days after the shot.  4.  Postnasal drip.  I recommended Flonase 2 sprays each nostril twice daily for 1 month.  She can stop after 1 month if there is no benefit, but she will continue if the Flonase helps.  She can decrease  to once daily after 1 month if it helps the postnasal drip.  I spent 67 minutes reviewing chart, talking with and examining patient, reviewing test results, formulating plan and documentation on the day of the encounter (excluding PFT review).

## 2023-07-14 NOTE — PATIENT INSTRUCTIONS
Decrease prednisone 9 mg daily for 1 month, then 8 mg daily for 1 month, then continue 7 mg daily  Get the bivalent COVID-19 booster  Stop mycophenolate 3 days before shot and restart 7 days after covid-19 booster  Never take Arava (leflunomide)  Flonase 2 sprays each nostril twice daily - try this for 1 month minimum, then decrease to once daily

## 2023-07-17 LAB
DLCOUNC-%PRED-PRE: 63 %
DLCOUNC-PRE: 13.45 ML/MIN/MMHG
DLCOUNC-PRED: 21.04 ML/MIN/MMHG
ERV-%PRED-PRE: 98 %
ERV-PRE: 0.99 L
ERV-PRED: 1.01 L
EXPTIME-PRE: 6.56 SEC
FEF2575-%PRED-PRE: 218 %
FEF2575-PRE: 5.45 L/SEC
FEF2575-PRED: 2.49 L/SEC
FEFMAX-%PRED-PRE: 137 %
FEFMAX-PRE: 9.39 L/SEC
FEFMAX-PRED: 6.82 L/SEC
FEV1-%PRED-PRE: 100 %
FEV1-PRE: 2.61 L
FEV1FEV6-PRE: 94 %
FEV1FEV6-PRED: 82 %
FEV1FVC-PRE: 94 %
FEV1FVC-PRED: 81 %
FEV1SVC-PRE: 91 %
FEV1SVC-PRED: 69 %
FIFMAX-PRE: 4.85 L/SEC
FVC-%PRED-PRE: 86 %
FVC-PRE: 2.79 L
FVC-PRED: 3.24 L
IC-%PRED-PRE: 70 %
IC-PRE: 1.88 L
IC-PRED: 2.66 L
VA-%PRED-PRE: 77 %
VA-PRE: 3.88 L
VC-%PRED-PRE: 75 %
VC-PRE: 2.87 L
VC-PRED: 3.78 L

## 2023-08-08 DIAGNOSIS — J84.9 INTERSTITIAL LUNG DISEASE (H): ICD-10-CM

## 2023-08-08 DIAGNOSIS — J84.9 ILD (INTERSTITIAL LUNG DISEASE) (H): ICD-10-CM

## 2023-08-08 DIAGNOSIS — M06.9 RHEUMATOID ARTHRITIS, INVOLVING UNSPECIFIED SITE, UNSPECIFIED WHETHER RHEUMATOID FACTOR PRESENT (H): ICD-10-CM

## 2023-08-08 RX ORDER — FLUTICASONE FUROATE AND VILANTEROL 200; 25 UG/1; UG/1
1 POWDER RESPIRATORY (INHALATION) DAILY
Qty: 1 EACH | Refills: 3 | Status: SHIPPED | OUTPATIENT
Start: 2023-08-08 | End: 2023-08-15

## 2023-08-08 RX ORDER — MYCOPHENOLATE MOFETIL 500 MG/1
1000 TABLET ORAL 2 TIMES DAILY
Qty: 120 TABLET | Refills: 3 | Status: SHIPPED | OUTPATIENT
Start: 2023-08-08 | End: 2023-09-06

## 2023-08-15 ENCOUNTER — TELEPHONE (OUTPATIENT)
Dept: PULMONOLOGY | Facility: CLINIC | Age: 53
End: 2023-08-15
Payer: COMMERCIAL

## 2023-08-15 DIAGNOSIS — J84.9 ILD (INTERSTITIAL LUNG DISEASE) (H): ICD-10-CM

## 2023-08-15 RX ORDER — FLUTICASONE FUROATE AND VILANTEROL 200; 25 UG/1; UG/1
1 POWDER RESPIRATORY (INHALATION) DAILY
Qty: 3 EACH | Refills: 3 | Status: SHIPPED | OUTPATIENT
Start: 2023-08-15 | End: 2024-07-31

## 2023-09-06 DIAGNOSIS — J84.9 INTERSTITIAL LUNG DISEASE (H): ICD-10-CM

## 2023-09-06 DIAGNOSIS — J84.9 ILD (INTERSTITIAL LUNG DISEASE) (H): ICD-10-CM

## 2023-09-06 DIAGNOSIS — J21.9 BRONCHIOLITIS: ICD-10-CM

## 2023-09-06 DIAGNOSIS — M06.9 RHEUMATOID ARTHRITIS, INVOLVING UNSPECIFIED SITE, UNSPECIFIED WHETHER RHEUMATOID FACTOR PRESENT (H): ICD-10-CM

## 2023-09-06 RX ORDER — PREDNISONE 5 MG/1
TABLET ORAL
Qty: 90 TABLET | Refills: 4 | Status: SHIPPED | OUTPATIENT
Start: 2023-09-06

## 2023-09-06 RX ORDER — PREDNISONE 1 MG/1
TABLET ORAL
Qty: 360 TABLET | Refills: 4 | Status: SHIPPED | OUTPATIENT
Start: 2023-09-06 | End: 2024-10-03

## 2023-09-06 RX ORDER — MYCOPHENOLATE MOFETIL 500 MG/1
1000 TABLET ORAL 2 TIMES DAILY
Qty: 360 TABLET | Refills: 3 | Status: SHIPPED | OUTPATIENT
Start: 2023-09-06 | End: 2024-07-15

## 2023-10-20 ENCOUNTER — LAB (OUTPATIENT)
Dept: LAB | Facility: CLINIC | Age: 53
End: 2023-10-20
Attending: INTERNAL MEDICINE
Payer: COMMERCIAL

## 2023-10-20 ENCOUNTER — OFFICE VISIT (OUTPATIENT)
Dept: PULMONOLOGY | Facility: CLINIC | Age: 53
End: 2023-10-20
Attending: INTERNAL MEDICINE
Payer: COMMERCIAL

## 2023-10-20 VITALS
SYSTOLIC BLOOD PRESSURE: 112 MMHG | BODY MASS INDEX: 30.7 KG/M2 | DIASTOLIC BLOOD PRESSURE: 64 MMHG | OXYGEN SATURATION: 96 % | WEIGHT: 191 LBS | HEIGHT: 66 IN | HEART RATE: 62 BPM

## 2023-10-20 DIAGNOSIS — M06.9 RHEUMATOID ARTHRITIS, INVOLVING UNSPECIFIED SITE, UNSPECIFIED WHETHER RHEUMATOID FACTOR PRESENT (H): ICD-10-CM

## 2023-10-20 DIAGNOSIS — J21.9 BRONCHIOLITIS: ICD-10-CM

## 2023-10-20 DIAGNOSIS — J84.9 ILD (INTERSTITIAL LUNG DISEASE) (H): Primary | ICD-10-CM

## 2023-10-20 DIAGNOSIS — J84.9 INTERSTITIAL LUNG DISEASE (H): ICD-10-CM

## 2023-10-20 DIAGNOSIS — J84.9 ILD (INTERSTITIAL LUNG DISEASE) (H): ICD-10-CM

## 2023-10-20 LAB
6 MIN WALK (FT): 1445 FT
6 MIN WALK (M): 440 M
ALBUMIN SERPL BCG-MCNC: 4.1 G/DL (ref 3.5–5.2)
ALP SERPL-CCNC: 49 U/L (ref 35–104)
ALT SERPL W P-5'-P-CCNC: 11 U/L (ref 0–50)
ANION GAP SERPL CALCULATED.3IONS-SCNC: 10 MMOL/L (ref 7–15)
AST SERPL W P-5'-P-CCNC: 22 U/L (ref 0–45)
BASO+EOS+MONOS # BLD AUTO: NORMAL 10*3/UL
BASO+EOS+MONOS NFR BLD AUTO: NORMAL %
BASOPHILS # BLD AUTO: 0.1 10E3/UL (ref 0–0.2)
BASOPHILS NFR BLD AUTO: 1 %
BILIRUB SERPL-MCNC: 0.4 MG/DL
BUN SERPL-MCNC: 11.3 MG/DL (ref 6–20)
CALCIUM SERPL-MCNC: 9 MG/DL (ref 8.6–10)
CHLORIDE SERPL-SCNC: 104 MMOL/L (ref 98–107)
CREAT SERPL-MCNC: 0.86 MG/DL (ref 0.51–0.95)
CRP SERPL-MCNC: <3 MG/L
DEPRECATED HCO3 PLAS-SCNC: 27 MMOL/L (ref 22–29)
EGFRCR SERPLBLD CKD-EPI 2021: 80 ML/MIN/1.73M2
EOSINOPHIL # BLD AUTO: 0.3 10E3/UL (ref 0–0.7)
EOSINOPHIL NFR BLD AUTO: 3 %
ERYTHROCYTE [DISTWIDTH] IN BLOOD BY AUTOMATED COUNT: 12.8 % (ref 10–15)
GLUCOSE SERPL-MCNC: 56 MG/DL (ref 70–99)
HCT VFR BLD AUTO: 42 % (ref 35–47)
HGB BLD-MCNC: 13.6 G/DL (ref 11.7–15.7)
IMM GRANULOCYTES # BLD: 0.1 10E3/UL
IMM GRANULOCYTES NFR BLD: 1 %
LYMPHOCYTES # BLD AUTO: 1.8 10E3/UL (ref 0.8–5.3)
LYMPHOCYTES NFR BLD AUTO: 20 %
MCH RBC QN AUTO: 31.3 PG (ref 26.5–33)
MCHC RBC AUTO-ENTMCNC: 32.4 G/DL (ref 31.5–36.5)
MCV RBC AUTO: 97 FL (ref 78–100)
MONOCYTES # BLD AUTO: 1 10E3/UL (ref 0–1.3)
MONOCYTES NFR BLD AUTO: 11 %
NEUTROPHILS # BLD AUTO: 5.5 10E3/UL (ref 1.6–8.3)
NEUTROPHILS NFR BLD AUTO: 64 %
NRBC # BLD AUTO: 0 10E3/UL
NRBC BLD AUTO-RTO: 0 /100
PLATELET # BLD AUTO: 351 10E3/UL (ref 150–450)
POTASSIUM SERPL-SCNC: 3.6 MMOL/L (ref 3.4–5.3)
PROT SERPL-MCNC: 6.6 G/DL (ref 6.4–8.3)
RBC # BLD AUTO: 4.34 10E6/UL (ref 3.8–5.2)
SODIUM SERPL-SCNC: 141 MMOL/L (ref 135–145)
WBC # BLD AUTO: 8.7 10E3/UL (ref 4–11)

## 2023-10-20 PROCEDURE — 94729 DIFFUSING CAPACITY: CPT | Performed by: INTERNAL MEDICINE

## 2023-10-20 PROCEDURE — 90682 RIV4 VACC RECOMBINANT DNA IM: CPT | Performed by: INTERNAL MEDICINE

## 2023-10-20 PROCEDURE — 90670 PCV13 VACCINE IM: CPT | Performed by: INTERNAL MEDICINE

## 2023-10-20 PROCEDURE — 250N000011 HC RX IP 250 OP 636: Performed by: INTERNAL MEDICINE

## 2023-10-20 PROCEDURE — 86140 C-REACTIVE PROTEIN: CPT | Performed by: PATHOLOGY

## 2023-10-20 PROCEDURE — 94618 PULMONARY STRESS TESTING: CPT | Performed by: INTERNAL MEDICINE

## 2023-10-20 PROCEDURE — G0009 ADMIN PNEUMOCOCCAL VACCINE: HCPCS | Performed by: INTERNAL MEDICINE

## 2023-10-20 PROCEDURE — 36415 COLL VENOUS BLD VENIPUNCTURE: CPT | Performed by: PATHOLOGY

## 2023-10-20 PROCEDURE — 94726 PLETHYSMOGRAPHY LUNG VOLUMES: CPT | Performed by: INTERNAL MEDICINE

## 2023-10-20 PROCEDURE — 85025 COMPLETE CBC W/AUTO DIFF WBC: CPT | Performed by: PATHOLOGY

## 2023-10-20 PROCEDURE — 99213 OFFICE O/P EST LOW 20 MIN: CPT | Mod: 25 | Performed by: INTERNAL MEDICINE

## 2023-10-20 PROCEDURE — 94375 RESPIRATORY FLOW VOLUME LOOP: CPT | Performed by: INTERNAL MEDICINE

## 2023-10-20 PROCEDURE — 80053 COMPREHEN METABOLIC PANEL: CPT | Performed by: PATHOLOGY

## 2023-10-20 PROCEDURE — G0008 ADMIN INFLUENZA VIRUS VAC: HCPCS | Performed by: INTERNAL MEDICINE

## 2023-10-20 PROCEDURE — 99215 OFFICE O/P EST HI 40 MIN: CPT | Mod: 25 | Performed by: INTERNAL MEDICINE

## 2023-10-20 RX ORDER — SULFAMETHOXAZOLE/TRIMETHOPRIM 800-160 MG
TABLET ORAL
Qty: 36 TABLET | Refills: 4 | Status: SHIPPED | OUTPATIENT
Start: 2023-10-20

## 2023-10-20 RX ADMIN — PNEUMOCOCCAL 13-VALENT CONJUGATE VACCINE 0.5 ML: 2.2; 2.2; 2.2; 2.2; 2.2; 4.4; 2.2; 2.2; 2.2; 2.2; 2.2; 2.2; 2.2 INJECTION, SUSPENSION INTRAMUSCULAR at 11:54

## 2023-10-20 RX ADMIN — INFLUENZA A VIRUS A/WEST VIRGINIA/30/2022 (H1N1) RECOMBINANT HEMAGGLUTININ ANTIGEN, INFLUENZA A VIRUS A/DARWIN/6/2021 (H3N2) RECOMBINANT HEMAGGLUTININ ANTIGEN, INFLUENZA B VIRUS B/AUSTRIA/1359417/2021 RECOMBINANT HEMAGGLUTININ ANTIGEN, AND INFLUENZA B VIRUS B/PHUKET/3073/2013 RECOMBINANT HEMAGGLUTININ ANTIGEN 0.5 ML: 45; 45; 45; 45 INJECTION INTRAMUSCULAR at 11:54

## 2023-10-20 ASSESSMENT — PAIN SCALES - GENERAL: PAINLEVEL: NO PAIN (0)

## 2023-10-20 NOTE — PROGRESS NOTES
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  HPI  Shelby Bennett is a 52 yo with rheumatoid arthritis-associated interstitial lung disease (NSIP with bronchiolitis) who is here for follow-up. She presented in 12/2022 for evaluation of worsening exertional dyspnea and initially saw Dr. Coats at [facility] in [place] who obtained a high resolution CT showing mosaic attenuation and air trapping, traction bronchiectasis, peribronchovascular distribution of groundglass and reticular opacities.   She was started on prednisone 40 mg daily.  She was seen in our ILD clinic in December 2022, and I started a prednisone taper for her rheumatoid arthritis ILD and started Breo ellipta for her bronchiolitis.  I started mycophenolate in July to treat her rheumatoid arthritis ILD and also as a steroid sparing agent.    10/20/2023  Today, Shelby reports no change in her respiratory symptoms since her last appointment. Dyspnea is stable and is still below her baseline prior to NSIP development. She can walk up 2 flights of stairs or walk 4+ miles without symptoms, but 3+ flights of stairs or steep inclines/hills cause shortness of breath and a mild burning sensation in her chest. She recovers back to her new baseline in a few minutes, without the need to stop walking.    She has tolerated Mycophenolate, Breo ellipta, and prednisone well. She began tapering her prednisone dose since her past appointment, and is currently at 7mg daily. She has not noticed  worsening in respiratory symptoms nor arthritis symptoms. She has not noticed any improvement in throat clearing with flonase.    She also inquired about vaccinations, and she is due for the new COVID vaccine, as well as influenza and pneumonia vaccines. She is not eligible for RSV vaccine at this time due to  her age.    Overall, she is feeling well on current regimen and would like to continue tapering prednisone, if recommended.  For her rheumatoid arthritis, she takes Orencia, sulfasalazine and hydroxychloroquine.          Current Outpatient Medications   Medication    cholecalciferol (VITAMIN D3) 25 mcg (1000 units) capsule    fluticasone-vilanterol (BREO ELLIPTA) 200-25 MCG/ACT inhaler    hydroxychloroquine (PLAQUENIL) 200 MG tablet    levothyroxine (SYNTHROID/LEVOTHROID) 200 MCG tablet    lisinopril (ZESTRIL) 5 MG tablet    mycophenolate (GENERIC EQUIVALENT) 500 MG tablet    ORENCIA CLICKJECT 125 MG/ML SOAJ auto-injector    predniSONE (DELTASONE) 1 MG tablet    predniSONE (DELTASONE) 20 MG tablet    predniSONE (DELTASONE) 5 MG tablet    sulfamethoxazole-trimethoprim (BACTRIM DS) 800-160 MG tablet    sulfaSALAzine (AZULFIDINE) 500 MG tablet    predniSONE (DELTASONE) 10 MG tablet     No current facility-administered medications for this visit.     No Known Allergies  Past Medical History:   Diagnosis Date    ILD (interstitial lung disease) (H)     RA ILD dx 2022    RA (rheumatoid arthritis) (H)     dx 2003; 2022 +RF and CCP, but initially seroneg; methotrexate [id number]       No past surgical history on file.    Social History     Socioeconomic History    Marital status:      Spouse name: Not on file    Number of children: Not on file    Years of education: Not on file    Highest education level: Not on file   Occupational History    Not on file   Tobacco Use    Smoking status: Never    Smokeless tobacco: Never   Substance and Sexual Activity    Alcohol use: Yes    Drug use: Never    Sexual activity: Not on file   Other Topics Concern    Not on file   Social History Narrative    .  .        ILD exposure questions:    Occupation:     Asbestos: no    Silica: no    Mold: no    Pet bird: no    Hot tub: no    Portable humidifier: no    Brass or woodwind instruments: not since high school     "Smoking tobacco or marijuana: no, no vaping    Feather pillow/blanket: no    Gardening: yes    Hobbies: walking, gardening, reading, fishing, travelling    Chemotherapy or radiation therapy: no    Fam hx of ILD: no      Social Determinants of Health     Financial Resource Strain: Not on file   Food Insecurity: Not on file   Transportation Needs: Not on file   Physical Activity: Not on file   Stress: Not on file   Social Connections: Not on file   Interpersonal Safety: Not on file   Housing Stability: Not on file       Family History   Problem Relation Age of Onset    Rheumatoid Arthritis Sister     Interstitial Lung Disease No family hx of          Vitals: /64 (BP Location: Right arm, Patient Position: Sitting, Cuff Size: Adult Regular)   Pulse 62   Ht 1.664 m (5' 5.5\")   Wt 86.6 kg (191 lb)   SpO2 96%   BMI 31.30 kg/m      Exam:   GENERAL APPEARANCE: Well developed, well nourished, alert, and in no apparent distress.  RESP: good air flow throughout.  Bibasilar inspiratory crackles. No rhonchi. No wheezes.   CV: Normal S1, S2, regular rhythm, normal rate. No murmur.  No LE edema.   MS: extremities normal. No clubbing. No cyanosis.  SKIN: no rash on limited exam.  NEURO: Mentation intact, speech normal, normal gait and stance.  PSYCH: mentation appears normal. and affect normal/bright.    Results:  Recent Results (from the past 168 hour(s))   6 minute walk test    Collection Time: 10/20/23 12:00 AM   Result Value Ref Range    6 min walk (FT) 1,445 ft    6 Min Walk (M) 440 m   Comprehensive metabolic panel    Collection Time: 10/20/23  9:20 AM   Result Value Ref Range    Sodium 141 135 - 145 mmol/L    Potassium 3.6 3.4 - 5.3 mmol/L    Carbon Dioxide (CO2) 27 22 - 29 mmol/L    Anion Gap 10 7 - 15 mmol/L    Urea Nitrogen 11.3 6.0 - 20.0 mg/dL    Creatinine 0.86 0.51 - 0.95 mg/dL    GFR Estimate 80 >60 mL/min/1.73m2    Calcium 9.0 8.6 - 10.0 mg/dL    Chloride 104 98 - 107 mmol/L    Glucose 56 (L) 70 - 99 mg/dL "    Alkaline Phosphatase 49 35 - 104 U/L    AST 22 0 - 45 U/L    ALT 11 0 - 50 U/L    Protein Total 6.6 6.4 - 8.3 g/dL    Albumin 4.1 3.5 - 5.2 g/dL    Bilirubin Total 0.4 <=1.2 mg/dL   CRP inflammation    Collection Time: 10/20/23  9:20 AM   Result Value Ref Range    CRP Inflammation <3.00 <5.00 mg/L   CBC with platelets and differential    Collection Time: 10/20/23  9:20 AM   Result Value Ref Range    WBC Count 8.7 4.0 - 11.0 10e3/uL    RBC Count 4.34 3.80 - 5.20 10e6/uL    Hemoglobin 13.6 11.7 - 15.7 g/dL    Hematocrit 42.0 35.0 - 47.0 %    MCV 97 78 - 100 fL    MCH 31.3 26.5 - 33.0 pg    MCHC 32.4 31.5 - 36.5 g/dL    RDW 12.8 10.0 - 15.0 %    Platelet Count 351 150 - 450 10e3/uL    % Neutrophils 64 %    % Lymphocytes 20 %    % Monocytes 11 %    Mids % (Monos, Eos, Basos)      % Eosinophils 3 %    % Basophils 1 %    % Immature Granulocytes 1 %    NRBCs per 100 WBC 0 <1 /100    Absolute Neutrophils 5.5 1.6 - 8.3 10e3/uL    Absolute Lymphocytes 1.8 0.8 - 5.3 10e3/uL    Absolute Monocytes 1.0 0.0 - 1.3 10e3/uL    Mids Abs (Monos, Eos, Basos)      Absolute Eosinophils 0.3 0.0 - 0.7 10e3/uL    Absolute Basophils 0.1 0.0 - 0.2 10e3/uL    Absolute Immature Granulocytes 0.1 <=0.4 10e3/uL    Absolute NRBCs 0.0 10e3/uL   General PFT Lab (Please always keep checked)    Collection Time: 10/20/23  9:28 AM   Result Value Ref Range    FVC-Pred 3.23 L    FVC-Pre 2.87 L    FVC-%Pred-Pre 88 %    FEV1-Pre 2.64 L    FEV1-%Pred-Pre 101 %    FEV1FVC-Pred 81 %    FEV1FVC-Pre 92 %    FEFMax-Pred 6.80 L/sec    FEFMax-Pre 9.14 L/sec    FEFMax-%Pred-Pre 134 %    FEF2575-Pred 2.48 L/sec    FEF2575-Pre 5.59 L/sec    TTG8757-%Pred-Pre 225 %    ExpTime-Pre 5.16 sec    FIFMax-Pre 4.01 L/sec    VC-Pred 3.48 L    VC-Pre 2.83 L    VC-%Pred-Pre 81 %    IC-Pred 2.40 L    IC-Pre 1.71 L    IC-%Pred-Pre 71 %    ERV-Pred 1.20 L    ERV-Pre 1.13 L    ERV-%Pred-Pre 94 %    FEV1FEV6-Pred 82 %    FEV1FEV6-Pre 92 %    FRCPleth-Pred 2.78 L    FRCPleth-Pre 2.48  L    FRCPleth-%Pred-Pre 89 %    RVPleth-Pred 1.88 L    RVPleth-Pre 1.35 L    RVPleth-%Pred-Pre 71 %    TLCPleth-Pred 5.19 L    TLCPleth-Pre 4.19 L    TLCPleth-%Pred-Pre 80 %    DLCOunc-Pred 21.01 ml/min/mmHg    DLCOunc-Pre 13.13 ml/min/mmHg    DLCOunc-%Pred-Pre 62 %    DLCOcor-Pre 13.05 ml/min/mmHg    DLCOcor-%Pred-Pre 62 %    VA-Pre 3.82 L    VA-%Pred-Pre 76 %    FEV1SVC-Pred 75 %    FEV1SVC-Pre 93 %         FVC FEV1 TLC DLCO   12- [facility] 2.54 71% 2.31 81% 4.01 73% 11.14 52%   4- [facility] 2.85 87% 2.66 101% 4.15 80% 14.22 64%   7- 2.79 86% 2.61 100% 4.40 80% 13.45 63%   10- 2.87 88% 2.64 101% 4.19 80% 13.13 62%        Dr. Villarreal and I reviewed the pulmonary function test that was performed today. This shows mild restriction with a moderate diffusion defect. Lung function is stable and remains improved compared to December 2022. We also reviewed labs today, and they are within normal limits.    We reviewed results with the patient.    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   1.  Rheumatoid arthritis ILD and bronchiolitis.  Shelby has mild dyspnea on exertion that is stable, and PFT is stable with overall improvement compared to December 2022.  She has tolerated the prednisone taper so far, and I will decrease prednisone again to 6 mg daily for 1 month then 5 mg daily until she returns to see me in clinic.  I encouraged her to do daily physical activity as she is doing.  The goal is to try to get her off prednisone if possible, but this will be a slow process.  If we taper the prednisone too quickly, then there is a risk of flare of her rheumatoid arthritis ILD.  If her ILD worsens, then we have options of increasing prednisone again versus nintedanib for fibrosis versus changing Orencia  to tocilizumab versus rituximab.  There is no need to change her medication regimen at this time as she is stable.  She will continue Breo Ellipta for the bronchiolitis (small airways disease).  She should avoid leflunomide (Arava) for rheumatoid arthritis due to the risk of leflunomide lung.  She will return in 3 to 4 months with full PFT.  2.  High risk medication immunosuppression monitoring.  Labs are normal, and I will repeat labs when she comes back to clinic to monitor mycophenolate.  She will continue Bactrim for pneumocystis prophylaxis as long as she is on both mycophenolate and prednisone.  3.  Healthcare maintenance.  I recommended that she get the flu vaccine and the Prevnar 13 pneumonia vaccine and the updated COVID-19 vaccine.  She should stop mycophenolate 3 days before the COVID-19 vaccine and restart 7 days after the shot.  We will give her the flu vaccine and the Prevnar 13 vaccine today in clinic.  She is in agreement with this plan.  I saw and evaluated patient with medical student.  Case discussed - agree with note.  I reviewed pulmonary function test per my table above.  This shows mild restriction with a moderate diffusion defect.  Lung function is stable and overall improved compared to outside PFT in December 2022.  I also reviewed labs today, and they are within normal limits including CRP.  I personally wrote the assessment and plan above.    I spent 48 minutes reviewing chart, talking with and examining patient, reviewing test results, formulating plan and documentation on the day of the encounter (excluding PFT review).  JIMENEZ MEJIAS M.D.

## 2023-10-20 NOTE — LETTER
10/20/2023         RE: Shelby Bennett  [address] [facility] [address]        Dear Colleague,    Thank you for referring your patient, Shelby Bennett, to the [facility] AND [facility] [place]. Please see a copy of my visit note below.    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 She presented in 12/2022 for evaluation of worsening exertional dyspnea and initially saw Dr. Coats at [facility] in [place] who obtained a high resolution CT showing mosaic attenuation and air trapping, traction bronchiectasis, peribronchovascular distribution of groundglass and reticular opacities.   She was started on prednisone 40 mg daily.  She was seen in our ILD clinic in December 2022, and I started a prednisone taper for her rheumatoid arthritis ILD and started Breo ellipta for her bronchiolitis.  I started mycophenolate in July to treat her rheumatoid arthritis ILD and also as a steroid sparing agent.    10/20/2023  Today, Shelby reports no change in her respiratory symptoms since her last appointment. Dyspnea is stable and is still below her baseline prior to NSIP development. She can walk up 2 flights of stairs or walk 4+ miles without symptoms, but 3+ flights of stairs or steep inclines/hills cause shortness of breath and a mild burning sensation in her chest. She recovers back to her new baseline in a few minutes, without the need to stop walking.    She has tolerated Mycophenolate, Breo ellipta, and prednisone well. She began tapering her prednisone dose since her past appointment, and is currently at 7mg daily. She has not noticed  worsening in respiratory  symptoms nor arthritis symptoms. She has not noticed any improvement in throat clearing with flonase.    She also inquired about vaccinations, and she is due for the new COVID vaccine, as well as influenza and pneumonia vaccines. She is not eligible for RSV vaccine at this time due to her age.    Overall, she is feeling well on current regimen and would like to continue tapering prednisone, if recommended.  For her rheumatoid arthritis, she takes Orencia, sulfasalazine and hydroxychloroquine.          Current Outpatient Medications   Medication    cholecalciferol (VITAMIN D3) 25 mcg (1000 units) capsule    fluticasone-vilanterol (BREO ELLIPTA) 200-25 MCG/ACT inhaler    hydroxychloroquine (PLAQUENIL) 200 MG tablet    levothyroxine (SYNTHROID/LEVOTHROID) 200 MCG tablet    lisinopril (ZESTRIL) 5 MG tablet    mycophenolate (GENERIC EQUIVALENT) 500 MG tablet    ORENCIA CLICKJECT 125 MG/ML SOAJ auto-injector    predniSONE (DELTASONE) 1 MG tablet    predniSONE (DELTASONE) 20 MG tablet    predniSONE (DELTASONE) 5 MG tablet    sulfamethoxazole-trimethoprim (BACTRIM DS) 800-160 MG tablet    sulfaSALAzine (AZULFIDINE) 500 MG tablet    predniSONE (DELTASONE) 10 MG tablet     No current facility-administered medications for this visit.     No Known Allergies  Past Medical History:   Diagnosis Date    ILD (interstitial lung disease) (H)     RA ILD dx 2022    RA (rheumatoid arthritis) (H)     dx 2003; 2022 +RF and CCP, but initially seroneg; methotrexate 8958-6706       No past surgical history on file.    Social History     Socioeconomic History    Marital status:      Spouse name: Not on file    Number of children: Not on file    Years of education: Not on file    Highest education level: Not on file   Occupational History    Not on file   Tobacco Use    Smoking status: Never    Smokeless tobacco: Never   Substance and Sexual Activity    Alcohol use: Yes    Drug use: Never    Sexual activity: Not on file   Other Topics  "Concern    Not on file   Social History Narrative    .  .        ILD exposure questions:    Occupation:     Asbestos: no    Silica: no    Mold: no    Pet bird: no    Hot tub: no    Portable humidifier: no    Brass or woodwind instruments: not since high school    Smoking tobacco or marijuana: no, no vaping    Feather pillow/blanket: no    Gardening: yes    Hobbies: walking, gardening, reading, fishing, travelling    Chemotherapy or radiation therapy: no    Fam hx of ILD: no      Social Determinants of Health     Financial Resource Strain: Not on file   Food Insecurity: Not on file   Transportation Needs: Not on file   Physical Activity: Not on file   Stress: Not on file   Social Connections: Not on file   Interpersonal Safety: Not on file   Housing Stability: Not on file       Family History   Problem Relation Age of Onset    Rheumatoid Arthritis Sister     Interstitial Lung Disease No family hx of          Vitals: /64 (BP Location: Right arm, Patient Position: Sitting, Cuff Size: Adult Regular)   Pulse 62   Ht 1.664 m (5' 5.5\")   Wt 86.6 kg (191 lb)   SpO2 96%   BMI 31.30 kg/m      Exam:   GENERAL APPEARANCE: Well developed, well nourished, alert, and in no apparent distress.  RESP: good air flow throughout.  Bibasilar inspiratory crackles. No rhonchi. No wheezes.   CV: Normal S1, S2, regular rhythm, normal rate. No murmur.  No LE edema.   MS: extremities normal. No clubbing. No cyanosis.  SKIN: no rash on limited exam.  NEURO: Mentation intact, speech normal, normal gait and stance.  PSYCH: mentation appears normal. and affect normal/bright.    Results:  Recent Results (from the past 168 hour(s))   6 minute walk test    Collection Time: 10/20/23 12:00 AM   Result Value Ref Range    6 min walk (FT) 1,445 ft    6 Min Walk (M) 440 m   Comprehensive metabolic panel    Collection Time: 10/20/23  9:20 AM   Result Value Ref Range    Sodium 141 135 - 145 mmol/L    Potassium 3.6 3.4 - 5.3 " mmol/L    Carbon Dioxide (CO2) 27 22 - 29 mmol/L    Anion Gap 10 7 - 15 mmol/L    Urea Nitrogen 11.3 6.0 - 20.0 mg/dL    Creatinine 0.86 0.51 - 0.95 mg/dL    GFR Estimate 80 >60 mL/min/1.73m2    Calcium 9.0 8.6 - 10.0 mg/dL    Chloride 104 98 - 107 mmol/L    Glucose 56 (L) 70 - 99 mg/dL    Alkaline Phosphatase 49 35 - 104 U/L    AST 22 0 - 45 U/L    ALT 11 0 - 50 U/L    Protein Total 6.6 6.4 - 8.3 g/dL    Albumin 4.1 3.5 - 5.2 g/dL    Bilirubin Total 0.4 <=1.2 mg/dL   CRP inflammation    Collection Time: 10/20/23  9:20 AM   Result Value Ref Range    CRP Inflammation <3.00 <5.00 mg/L   CBC with platelets and differential    Collection Time: 10/20/23  9:20 AM   Result Value Ref Range    WBC Count 8.7 4.0 - 11.0 10e3/uL    RBC Count 4.34 3.80 - 5.20 10e6/uL    Hemoglobin 13.6 11.7 - 15.7 g/dL    Hematocrit 42.0 35.0 - 47.0 %    MCV 97 78 - 100 fL    MCH 31.3 26.5 - 33.0 pg    MCHC 32.4 31.5 - 36.5 g/dL    RDW 12.8 10.0 - 15.0 %    Platelet Count 351 150 - 450 10e3/uL    % Neutrophils 64 %    % Lymphocytes 20 %    % Monocytes 11 %    Mids % (Monos, Eos, Basos)      % Eosinophils 3 %    % Basophils 1 %    % Immature Granulocytes 1 %    NRBCs per 100 WBC 0 <1 /100    Absolute Neutrophils 5.5 1.6 - 8.3 10e3/uL    Absolute Lymphocytes 1.8 0.8 - 5.3 10e3/uL    Absolute Monocytes 1.0 0.0 - 1.3 10e3/uL    Mids Abs (Monos, Eos, Basos)      Absolute Eosinophils 0.3 0.0 - 0.7 10e3/uL    Absolute Basophils 0.1 0.0 - 0.2 10e3/uL    Absolute Immature Granulocytes 0.1 <=0.4 10e3/uL    Absolute NRBCs 0.0 10e3/uL   General PFT Lab (Please always keep checked)    Collection Time: 10/20/23  9:28 AM   Result Value Ref Range    FVC-Pred 3.23 L    FVC-Pre 2.87 L    FVC-%Pred-Pre 88 %    FEV1-Pre 2.64 L    FEV1-%Pred-Pre 101 %    FEV1FVC-Pred 81 %    FEV1FVC-Pre 92 %    FEFMax-Pred 6.80 L/sec    FEFMax-Pre 9.14 L/sec    FEFMax-%Pred-Pre 134 %    FEF2575-Pred 2.48 L/sec    FEF2575-Pre 5.59 L/sec    LPQ4069-%Pred-Pre 225 %    ExpTime-Pre 5.16  sec    FIFMax-Pre 4.01 L/sec    VC-Pred 3.48 L    VC-Pre 2.83 L    VC-%Pred-Pre 81 %    IC-Pred 2.40 L    IC-Pre 1.71 L    IC-%Pred-Pre 71 %    ERV-Pred 1.20 L    ERV-Pre 1.13 L    ERV-%Pred-Pre 94 %    FEV1FEV6-Pred 82 %    FEV1FEV6-Pre 92 %    FRCPleth-Pred 2.78 L    FRCPleth-Pre 2.48 L    FRCPleth-%Pred-Pre 89 %    RVPleth-Pred 1.88 L    RVPleth-Pre 1.35 L    RVPleth-%Pred-Pre 71 %    TLCPleth-Pred 5.19 L    TLCPleth-Pre 4.19 L    TLCPleth-%Pred-Pre 80 %    DLCOunc-Pred 21.01 ml/min/mmHg    DLCOunc-Pre 13.13 ml/min/mmHg    DLCOunc-%Pred-Pre 62 %    DLCOcor-Pre 13.05 ml/min/mmHg    DLCOcor-%Pred-Pre 62 %    VA-Pre 3.82 L    VA-%Pred-Pre 76 %    FEV1SVC-Pred 75 %    FEV1SVC-Pre 93 %         FVC FEV1 TLC DLCO   12- [facility] 2.54 71% 2.31 81% 4.01 73% 11.14 52%   4- [facility] 2.85 87% 2.66 101% 4.15 80% 14.22 64%   7- 2.79 86% 2.61 100% 4.40 80% 13.45 63%   10- 2.87 88% 2.64 101% 4.19 80% 13.13 62%        Dr. Villarreal and I reviewed the pulmonary function test that was performed today. This shows mild restriction with a moderate diffusion defect. Lung function is stable and remains improved compared to December 2022. We also reviewed labs today, and they are within normal limits.    We reviewed results with the patient.    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   1.  Rheumatoid arthritis ILD and bronchiolitis.  Shelby has mild dyspnea on exertion that is stable, and PFT is stable with overall improvement compared to December 2022.  She has tolerated the prednisone taper so far, and I will decrease prednisone again to 6 mg daily for 1 month then 5 mg daily until she returns to see me in clinic.  I encouraged her to do daily physical activity as she is doing.  The goal is to try to get her off  prednisone if possible, but this will be a slow process.  If we taper the prednisone too quickly, then there is a risk of flare of her rheumatoid arthritis ILD.  If her ILD worsens, then we have options of increasing prednisone again versus nintedanib for fibrosis versus changing Orencia to tocilizumab versus rituximab.  There is no need to change her medication regimen at this time as she is stable.  She will continue Breo Ellipta for the bronchiolitis (small airways disease).  She should avoid leflunomide (Arava) for rheumatoid arthritis due to the risk of leflunomide lung.  She will return in 3 to 4 months with full PFT.  2.  High risk medication immunosuppression monitoring.  Labs are normal, and I will repeat labs when she comes back to clinic to monitor mycophenolate.  She will continue Bactrim for pneumocystis prophylaxis as long as she is on both mycophenolate and prednisone.  3.  Healthcare maintenance.  I recommended that she get the flu vaccine and the Prevnar 13 pneumonia vaccine and the updated COVID-19 vaccine.  She should stop mycophenolate 3 days before the COVID-19 vaccine and restart 7 days after the shot.  We will give her the flu vaccine and the Prevnar 13 vaccine today in clinic.  She is in agreement with this plan.  I saw and evaluated patient with medical student.  Case discussed - agree with note.  I reviewed pulmonary function test per my table above.  This shows mild restriction with a moderate diffusion defect.  Lung function is stable and overall improved compared to outside PFT in December 2022.  I also reviewed labs today, and they are within normal limits including CRP.  I personally wrote the assessment and plan above.    I spent 48 minutes reviewing chart, talking with and examining patient, reviewing test results, formulating plan and documentation on the day of the encounter (excluding PFT review).  JIMENEZ MEJIAS M.D.       no

## 2023-10-20 NOTE — NURSING NOTE
Chief Complaint   Patient presents with    Interstitial Lung Disease (ILD)     ILD follow up      Vitals were taken and medications were reconciled.     Suha Macias RMA  10:49 AM

## 2023-10-20 NOTE — PATIENT INSTRUCTIONS
Decrease prednisone to 6 mg daily for 1 month, then decrease to 5 mg daily  Get the updated COVID-19 vaccine and the flu vaccine  Get the Prevnar-13 pneumonia vaccine  Stop mycophenolate 3 days before COVID-19 vaccine and restart 7 days after shot  Avoid leflunomide (Arava) for rheumatoid arthritis  Stop flonase

## 2023-10-24 LAB
DLCOCOR-%PRED-PRE: 62 %
DLCOCOR-PRE: 13.05 ML/MIN/MMHG
DLCOUNC-%PRED-PRE: 62 %
DLCOUNC-PRE: 13.13 ML/MIN/MMHG
DLCOUNC-PRED: 21.01 ML/MIN/MMHG
ERV-%PRED-PRE: 94 %
ERV-PRE: 1.13 L
ERV-PRED: 1.2 L
EXPTIME-PRE: 5.16 SEC
FEF2575-%PRED-PRE: 225 %
FEF2575-PRE: 5.59 L/SEC
FEF2575-PRED: 2.48 L/SEC
FEFMAX-%PRED-PRE: 134 %
FEFMAX-PRE: 9.14 L/SEC
FEFMAX-PRED: 6.8 L/SEC
FEV1-%PRED-PRE: 101 %
FEV1-PRE: 2.64 L
FEV1FEV6-PRE: 92 %
FEV1FEV6-PRED: 82 %
FEV1FVC-PRE: 92 %
FEV1FVC-PRED: 81 %
FEV1SVC-PRE: 93 %
FEV1SVC-PRED: 75 %
FIFMAX-PRE: 4.01 L/SEC
FRCPLETH-%PRED-PRE: 89 %
FRCPLETH-PRE: 2.48 L
FRCPLETH-PRED: 2.78 L
FVC-%PRED-PRE: 88 %
FVC-PRE: 2.87 L
FVC-PRED: 3.23 L
IC-%PRED-PRE: 71 %
IC-PRE: 1.71 L
IC-PRED: 2.4 L
RVPLETH-%PRED-PRE: 71 %
RVPLETH-PRE: 1.35 L
RVPLETH-PRED: 1.88 L
TLCPLETH-%PRED-PRE: 80 %
TLCPLETH-PRE: 4.19 L
TLCPLETH-PRED: 5.19 L
VA-%PRED-PRE: 76 %
VA-PRE: 3.82 L
VC-%PRED-PRE: 81 %
VC-PRE: 2.83 L
VC-PRED: 3.48 L

## 2023-11-06 ENCOUNTER — MYC MEDICAL ADVICE (OUTPATIENT)
Dept: PULMONOLOGY | Facility: CLINIC | Age: 53
End: 2023-11-06
Payer: COMMERCIAL

## 2023-11-22 ENCOUNTER — TELEPHONE (OUTPATIENT)
Dept: PULMONOLOGY | Facility: CLINIC | Age: 53
End: 2023-11-22
Payer: COMMERCIAL

## 2023-11-22 NOTE — TELEPHONE ENCOUNTER
Left Voicemail (1st Attempt) for the patient to call back and schedule the following:    Appointment type: LASHAE  Provider: MAGALIE  Return date: 01/25/24  Specialty phone number: [phone]  Additional appointment(s) needed: NA  Additonal Notes: NA

## 2024-01-13 ENCOUNTER — HEALTH MAINTENANCE LETTER (OUTPATIENT)
Age: 54
End: 2024-01-13

## 2024-02-01 ENCOUNTER — OFFICE VISIT (OUTPATIENT)
Dept: PULMONOLOGY | Facility: CLINIC | Age: 54
End: 2024-02-01
Attending: INTERNAL MEDICINE
Payer: COMMERCIAL

## 2024-02-01 ENCOUNTER — LAB (OUTPATIENT)
Dept: LAB | Facility: CLINIC | Age: 54
End: 2024-02-01
Attending: INTERNAL MEDICINE
Payer: COMMERCIAL

## 2024-02-01 VITALS
BODY MASS INDEX: 30.48 KG/M2 | OXYGEN SATURATION: 97 % | DIASTOLIC BLOOD PRESSURE: 73 MMHG | HEART RATE: 64 BPM | WEIGHT: 186 LBS | SYSTOLIC BLOOD PRESSURE: 128 MMHG

## 2024-02-01 DIAGNOSIS — J84.9 INTERSTITIAL LUNG DISEASE (H): ICD-10-CM

## 2024-02-01 DIAGNOSIS — M06.9 RHEUMATOID ARTHRITIS, INVOLVING UNSPECIFIED SITE, UNSPECIFIED WHETHER RHEUMATOID FACTOR PRESENT (H): ICD-10-CM

## 2024-02-01 DIAGNOSIS — J84.9 ILD (INTERSTITIAL LUNG DISEASE) (H): Primary | ICD-10-CM

## 2024-02-01 LAB
ALBUMIN SERPL BCG-MCNC: 4.2 G/DL (ref 3.5–5.2)
ALP SERPL-CCNC: 43 U/L (ref 40–150)
ALT SERPL W P-5'-P-CCNC: 14 U/L (ref 0–50)
ANION GAP SERPL CALCULATED.3IONS-SCNC: 9 MMOL/L (ref 7–15)
AST SERPL W P-5'-P-CCNC: 22 U/L (ref 0–45)
BASOPHILS # BLD AUTO: 0.1 10E3/UL (ref 0–0.2)
BASOPHILS NFR BLD AUTO: 1 %
BILIRUB SERPL-MCNC: 0.4 MG/DL
BUN SERPL-MCNC: 11 MG/DL (ref 6–20)
CALCIUM SERPL-MCNC: 9.5 MG/DL (ref 8.6–10)
CHLORIDE SERPL-SCNC: 104 MMOL/L (ref 98–107)
CREAT SERPL-MCNC: 0.88 MG/DL (ref 0.51–0.95)
DEPRECATED HCO3 PLAS-SCNC: 28 MMOL/L (ref 22–29)
DLCOUNC-%PRED-PRE: 65 %
DLCOUNC-PRE: 13.81 ML/MIN/MMHG
DLCOUNC-PRED: 20.99 ML/MIN/MMHG
EGFRCR SERPLBLD CKD-EPI 2021: 78 ML/MIN/1.73M2
EOSINOPHIL # BLD AUTO: 0.2 10E3/UL (ref 0–0.7)
EOSINOPHIL NFR BLD AUTO: 2 %
ERV-%PRED-PRE: 78 %
ERV-PRE: 0.94 L
ERV-PRED: 1.2 L
ERYTHROCYTE [DISTWIDTH] IN BLOOD BY AUTOMATED COUNT: 12.9 % (ref 10–15)
EXPTIME-PRE: 4.94 SEC
FEF2575-%PRED-PRE: 217 %
FEF2575-PRE: 5.35 L/SEC
FEF2575-PRED: 2.46 L/SEC
FEFMAX-%PRED-PRE: 131 %
FEFMAX-PRE: 8.93 L/SEC
FEFMAX-PRED: 6.79 L/SEC
FEV1-%PRED-PRE: 96 %
FEV1-PRE: 2.5 L
FEV1FEV6-PRE: 93 %
FEV1FEV6-PRED: 82 %
FEV1FVC-PRE: 93 %
FEV1FVC-PRED: 81 %
FEV1SVC-PRE: 90 %
FEV1SVC-PRED: 74 %
FIFMAX-PRE: 4.14 L/SEC
FRCPLETH-%PRED-PRE: 82 %
FRCPLETH-PRE: 2.29 L
FRCPLETH-PRED: 2.78 L
FVC-%PRED-PRE: 82 %
FVC-PRE: 2.67 L
FVC-PRED: 3.23 L
GLUCOSE SERPL-MCNC: 100 MG/DL (ref 70–99)
HCT VFR BLD AUTO: 43.4 % (ref 35–47)
HGB BLD-MCNC: 14.1 G/DL (ref 11.7–15.7)
IC-%PRED-PRE: 76 %
IC-PRE: 1.84 L
IC-PRED: 2.39 L
IMM GRANULOCYTES # BLD: 0.1 10E3/UL
IMM GRANULOCYTES NFR BLD: 1 %
LYMPHOCYTES # BLD AUTO: 1 10E3/UL (ref 0.8–5.3)
LYMPHOCYTES NFR BLD AUTO: 12 %
MCH RBC QN AUTO: 30.9 PG (ref 26.5–33)
MCHC RBC AUTO-ENTMCNC: 32.5 G/DL (ref 31.5–36.5)
MCV RBC AUTO: 95 FL (ref 78–100)
MONOCYTES # BLD AUTO: 0.7 10E3/UL (ref 0–1.3)
MONOCYTES NFR BLD AUTO: 8 %
NEUTROPHILS # BLD AUTO: 6.4 10E3/UL (ref 1.6–8.3)
NEUTROPHILS NFR BLD AUTO: 76 %
NRBC # BLD AUTO: 0 10E3/UL
NRBC BLD AUTO-RTO: 0 /100
PLATELET # BLD AUTO: 336 10E3/UL (ref 150–450)
POTASSIUM SERPL-SCNC: 4 MMOL/L (ref 3.4–5.3)
PROT SERPL-MCNC: 6.7 G/DL (ref 6.4–8.3)
RBC # BLD AUTO: 4.57 10E6/UL (ref 3.8–5.2)
RVPLETH-%PRED-PRE: 71 %
RVPLETH-PRE: 1.35 L
RVPLETH-PRED: 1.89 L
SODIUM SERPL-SCNC: 141 MMOL/L (ref 135–145)
TLCPLETH-%PRED-PRE: 79 %
TLCPLETH-PRE: 4.13 L
TLCPLETH-PRED: 5.19 L
VA-%PRED-PRE: 75 %
VA-PRE: 3.76 L
VC-%PRED-PRE: 80 %
VC-PRE: 2.78 L
VC-PRED: 3.48 L
WBC # BLD AUTO: 8.4 10E3/UL (ref 4–11)

## 2024-02-01 PROCEDURE — 94726 PLETHYSMOGRAPHY LUNG VOLUMES: CPT | Performed by: INTERNAL MEDICINE

## 2024-02-01 PROCEDURE — 99213 OFFICE O/P EST LOW 20 MIN: CPT | Performed by: INTERNAL MEDICINE

## 2024-02-01 PROCEDURE — 99000 SPECIMEN HANDLING OFFICE-LAB: CPT | Performed by: PATHOLOGY

## 2024-02-01 PROCEDURE — 36415 COLL VENOUS BLD VENIPUNCTURE: CPT | Performed by: PATHOLOGY

## 2024-02-01 PROCEDURE — 99215 OFFICE O/P EST HI 40 MIN: CPT | Mod: 25 | Performed by: INTERNAL MEDICINE

## 2024-02-01 PROCEDURE — 94375 RESPIRATORY FLOW VOLUME LOOP: CPT | Performed by: INTERNAL MEDICINE

## 2024-02-01 PROCEDURE — 80053 COMPREHEN METABOLIC PANEL: CPT | Performed by: INTERNAL MEDICINE

## 2024-02-01 PROCEDURE — 94729 DIFFUSING CAPACITY: CPT | Performed by: INTERNAL MEDICINE

## 2024-02-01 PROCEDURE — 85025 COMPLETE CBC W/AUTO DIFF WBC: CPT | Performed by: PATHOLOGY

## 2024-02-01 NOTE — PATIENT INSTRUCTIONS
Stop mycophenolate 3 days before COVID-19 vaccine and restart 7 days after  Continue exercise  Continue medications  Get a DEXA scan and talk with your primary  Take calcium in addition to vitamin D

## 2024-02-01 NOTE — PROGRESS NOTES
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 She presented in 12/2022 for evaluation of worsening exertional dyspnea and initially saw Dr. Coats at [facility] in [place] with a high resolution CT showing mosaic attenuation and air trapping, traction bronchiectasis, peribronchovascular distribution of groundglass and reticular opacities. She was started on prednisone 40 mg daily. She was seen in the ILD clinic in December 2022, where we started a slow prednisone taper for her rheumatoid arthritis ILD and started Breo ellipta for her bronchiolitis. We started mycophenolate in July 2023 to treat her rheumatoid arthritis ILD and also as a steroid sparing agent.  Orencia, sulfasalazine, and hydroxychloroquine are also taken for RA.      Today, Shelby reports feeling well. She has not noticed any changes in her symptoms (primarily shortness of breath with exertion) since her last appointment, despite continuing a prednisone taper. She can walk 2 flights of stairs before she experiences dyspnea. She can also have dyspnea walking up steep/long hills or walking at a fast pace while talking. She has been active over the past 4-5 weeks with walking and using the treadmill 5x/week. To manage ILD, she currently takes mycophenolate (1000mg BID), breo ellipta (200-25 MCG/ACT), and prednisone (5mg/day, down from 7mg/day October 2023). She takes bactrim (3x/week) for pneumocystis prophylaxis. She also takes orencia, sulfasalazine, and hydroxychloroquine to manage RA. Her RA has been well controlled and is managed by her rheumatologist. She had one mild flare on her L 1st MCP joint, which went away quickly  without medication change.     Shelby is up to date on her flu and pneumonia vaccines. She had COVID-19 infection in November, so she is due for her COVID booster in mid-February.     ROS revealed no pertinent positives. No headaches, dizziness, hearing changes, vision changes, dry eyes/mouth, chest pain, heartburn, constipation/diarrhea, urination troubles.          Current Outpatient Medications   Medication    cholecalciferol (VITAMIN D3) 25 mcg (1000 units) capsule    fluticasone-vilanterol (BREO ELLIPTA) 200-25 MCG/ACT inhaler    hydroxychloroquine (PLAQUENIL) 200 MG tablet    levothyroxine (SYNTHROID/LEVOTHROID) 200 MCG tablet    lisinopril (ZESTRIL) 5 MG tablet    mycophenolate (GENERIC EQUIVALENT) 500 MG tablet    ORENCIA CLICKJECT 125 MG/ML SOAJ auto-injector    predniSONE (DELTASONE) 1 MG tablet    predniSONE (DELTASONE) 10 MG tablet    predniSONE (DELTASONE) 20 MG tablet    predniSONE (DELTASONE) 5 MG tablet    sulfamethoxazole-trimethoprim (BACTRIM DS) 800-160 MG tablet    sulfaSALAzine (AZULFIDINE) 500 MG tablet     No current facility-administered medications for this visit.     No Known Allergies  Past Medical History:   Diagnosis Date    ILD (interstitial lung disease) (H)     RA ILD dx 2022    RA (rheumatoid arthritis) (H)     dx 2003; 2022 +RF and CCP, but initially seroneg; methotrexate 4502-5502       No past surgical history on file.    Social History     Socioeconomic History    Marital status:      Spouse name: Not on file    Number of children: Not on file    Years of education: Not on file    Highest education level: Not on file   Occupational History    Not on file   Tobacco Use    Smoking status: Never    Smokeless tobacco: Never   Substance and Sexual Activity    Alcohol use: Yes    Drug use: Never    Sexual activity: Not on file   Other Topics Concern    Not on file   Social History Narrative    .  .        ILD exposure questions:    Occupation:     Asbestos:  no    Silica: no    Mold: no    Pet bird: no    Hot tub: no    Portable humidifier: no    Brass or woodwind instruments: not since high school    Smoking tobacco or marijuana: no, no vaping    Feather pillow/blanket: no    Gardening: yes    Hobbies: walking, gardening, reading, fishing, travelling    Chemotherapy or radiation therapy: no    Fam hx of ILD: no      Social Determinants of Health     Financial Resource Strain: Not on file   Food Insecurity: Not on file   Transportation Needs: Not on file   Physical Activity: Not on file   Stress: Not on file   Social Connections: Not on file   Interpersonal Safety: Not on file   Housing Stability: Not on file       Family History   Problem Relation Age of Onset    Rheumatoid Arthritis Sister     Interstitial Lung Disease No family hx of          Vitals: There were no vitals taken for this visit.    Exam:   GENERAL APPEARANCE: Well developed, well nourished, alert, and in no apparent distress.  RESP:Good air flow throughout. No bibasilar inspiratory crackles. No rhonchi. No wheezes.   CV: Normal S1, S2, regular rhythm, normal rate. No murmur. No LE edema.   MS: Extremities normal. No clubbing. No cyanosis.  SKIN: No rash on limited exam.  NEURO: Mentation intact, speech normal, normal gait and stance.  PSYCH: Normal/bright affect.    Results:  Recent Results (from the past 168 hour(s))   General PFT Lab (Please always keep checked)    Collection Time: 02/01/24  7:14 AM   Result Value Ref Range    FVC-Pred 3.23 L    FVC-Pre 2.67 L    FVC-%Pred-Pre 82 %    FEV1-Pre 2.50 L    FEV1-%Pred-Pre 96 %    FEV1FVC-Pred 81 %    FEV1FVC-Pre 93 %    FEFMax-Pred 6.79 L/sec    FEFMax-Pre 8.93 L/sec    FEFMax-%Pred-Pre 131 %    FEF2575-Pred 2.46 L/sec    FEF2575-Pre 5.35 L/sec    LOE2227-%Pred-Pre 217 %    ExpTime-Pre 4.94 sec    FIFMax-Pre 4.14 L/sec    VC-Pred 3.48 L    VC-Pre 2.78 L    VC-%Pred-Pre 80 %    IC-Pred 2.39 L    IC-Pre 1.84 L    IC-%Pred-Pre 76 %    ERV-Pred 1.20 L     ERV-Pre 0.94 L    ERV-%Pred-Pre 78 %    FEV1FEV6-Pred 82 %    FEV1FEV6-Pre 93 %    FRCPleth-Pred 2.78 L    FRCPleth-Pre 2.29 L    FRCPleth-%Pred-Pre 82 %    RVPleth-Pred 1.89 L    RVPleth-Pre 1.35 L    RVPleth-%Pred-Pre 71 %    TLCPleth-Pred 5.19 L    TLCPleth-Pre 4.13 L    TLCPleth-%Pred-Pre 79 %    DLCOunc-Pred 20.99 ml/min/mmHg    DLCOunc-Pre 13.81 ml/min/mmHg    DLCOunc-%Pred-Pre 65 %    VA-Pre 3.76 L    VA-%Pred-Pre 75 %    FEV1SVC-Pred 74 %    FEV1SVC-Pre 90 %           FVC FEV1 TLC DLCO   12- [facility] 2.54 71% 2.31 81% 4.01 73% 11.14 52%   4- [facility] 2.85 87% 2.66 101% 4.15 80% 14.22 64%   7- 2.79 86% 2.61 100% 4.40 80% 13.45 63%   10- 2.87 88% 2.64 101% 4.19 80% 13.13 62%   2-1-2024 2.67 82% 2.50 96% 4.13 79% 13.81 65%       Dr. Villarreal and I reviewed the pulmonary function test that was performed today, which shows mild restriction and a moderate diffusion defect.  Lung function is stable and is improved compared to December 2022. Shelby had a minor drop in FVC to 2.67 from 2.87, but her FVC is still within her normal range.    We also reviewed labs today.     We reviewed results with the patient.    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   1.  Rheumatoid arthritis ILD and bronchiolitis.  Shelby has mild dyspnea on exertion that is stable, and PFT is stable with overall improvement compared to December 2022.  She has tolerated the prednisone taper so far, and I will continue prednisone 5 mg daily and mycophenolate 1000 mg bid.  I encouraged her to do daily physical activity as she is doing.  The goal is to try to get her off prednisone if possible, but this will be a slow process.  If we taper the prednisone too quickly, then there is a risk of flare of her rheumatoid arthritis ILD.   If her ILD worsens, then we have options of increasing prednisone again versus nintedanib for fibrosis versus changing Orencia to tocilizumab versus rituximab.  There is no need to change her medication regimen at this time as she is stable.  She will continue Breo Ellipta for the bronchiolitis (small airways disease).  She should avoid leflunomide (Arava) for rheumatoid arthritis due to the risk of leflunomide lung.  She will return in 3 to 4 months with full PFT.  2.  High risk medication immunosuppression monitoring.  Labs are normal, and I will repeat labs when she comes back to clinic to monitor mycophenolate.  She will continue Bactrim for pneumocystis prophylaxis as long as she is on both mycophenolate and prednisone.  3.  Healthcare maintenance.  I recommended that she get the updated COVID-19 booster vaccine later this month (3 mo after her COVID-19 infection).  She should stop mycophenolate 3 days before the COVID-19 vaccine and restart 7 days after the shot.   I saw and evaluated patient with medical student Daniele Zaldivar.  Case discussed - agree with note.  I reviewed pulmonary function test per my table above.  This shows mild restriction with a moderate diffusion defect.  Lung function is stable and overall improved compared to outside PFT in December 2022.  I also reviewed labs today, and they are within normal limits.  I edited the above note and personally wrote the assessment and plan.  I spent 44 minutes reviewing chart, talking with and examining patient, reviewing test results, formulating plan and documentation on the day of the encounter (excluding PFT review).  JIMENEZ MEJIAS M.D.

## 2024-02-01 NOTE — NURSING NOTE
Chief Complaint   Patient presents with    Interstitial Lung Disease (ILD)     3 month follow up      Vitals were taken and medications were reconciled.     Suha Macias RMA  9:35 AM

## 2024-02-01 NOTE — LETTER
2/1/2024         RE: Shelby Bennett  [address] [facility] [address]        Dear Colleague,    Thank you for referring your patient, Shelby Bennett, to the [facility] FOR LUNG SCIENCE AND [facility] CLINIC [place]. Please see a copy of my visit note below.    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 She presented in 12/2022 for evaluation of worsening exertional dyspnea and initially saw Dr. Coats at [facility] in [place] with a high resolution CT showing mosaic attenuation and air trapping, traction bronchiectasis, peribronchovascular distribution of groundglass and reticular opacities. She was started on prednisone 40 mg daily. She was seen in the ILD clinic in December 2022, where we started a slow prednisone taper for her rheumatoid arthritis ILD and started Breo ellipta for her bronchiolitis. We started mycophenolate in July 2023 to treat her rheumatoid arthritis ILD and also as a steroid sparing agent.  Orencia, sulfasalazine, and hydroxychloroquine are also taken for RA.      Today, Shelby reports feeling well. She has not noticed any changes in her symptoms (primarily shortness of breath with exertion) since her last appointment, despite continuing a prednisone taper. She can walk 2 flights of stairs before she experiences dyspnea. She can also have dyspnea walking up steep/long hills or walking at a fast pace while talking. She has been active over the past 4-5 weeks with walking and using the treadmill 5x/week. To manage ILD, she currently takes mycophenolate (1000mg BID), breo ellipta (200-25 MCG/ACT), and prednisone (5mg/day, down from 7mg/day  October 2023). She takes bactrim (3x/week) for pneumocystis prophylaxis. She also takes orencia, sulfasalazine, and hydroxychloroquine to manage RA. Her RA has been well controlled and is managed by her rheumatologist. She had one mild flare on her L 1st MCP joint, which went away quickly without medication change.     Shelby is up to date on her flu and pneumonia vaccines. She had COVID-19 infection in November, so she is due for her COVID booster in mid-February.     ROS revealed no pertinent positives. No headaches, dizziness, hearing changes, vision changes, dry eyes/mouth, chest pain, heartburn, constipation/diarrhea, urination troubles.          Current Outpatient Medications   Medication    cholecalciferol (VITAMIN D3) 25 mcg (1000 units) capsule    fluticasone-vilanterol (BREO ELLIPTA) 200-25 MCG/ACT inhaler    hydroxychloroquine (PLAQUENIL) 200 MG tablet    levothyroxine (SYNTHROID/LEVOTHROID) 200 MCG tablet    lisinopril (ZESTRIL) 5 MG tablet    mycophenolate (GENERIC EQUIVALENT) 500 MG tablet    ORENCIA CLICKJECT 125 MG/ML SOAJ auto-injector    predniSONE (DELTASONE) 1 MG tablet    predniSONE (DELTASONE) 10 MG tablet    predniSONE (DELTASONE) 20 MG tablet    predniSONE (DELTASONE) 5 MG tablet    sulfamethoxazole-trimethoprim (BACTRIM DS) 800-160 MG tablet    sulfaSALAzine (AZULFIDINE) 500 MG tablet     No current facility-administered medications for this visit.     No Known Allergies  Past Medical History:   Diagnosis Date    ILD (interstitial lung disease) (H)     RA ILD dx 2022    RA (rheumatoid arthritis) (H)     dx 2003; 2022 +RF and CCP, but initially seroneg; methotrexate [id number]       No past surgical history on file.    Social History     Socioeconomic History    Marital status:      Spouse name: Not on file    Number of children: Not on file    Years of education: Not on file    Highest education level: Not on file   Occupational History    Not on file   Tobacco Use    Smoking status:  Never    Smokeless tobacco: Never   Substance and Sexual Activity    Alcohol use: Yes    Drug use: Never    Sexual activity: Not on file   Other Topics Concern    Not on file   Social History Narrative    .  .        ILD exposure questions:    Occupation:     Asbestos: no    Silica: no    Mold: no    Pet bird: no    Hot tub: no    Portable humidifier: no    Brass or woodwind instruments: not since high school    Smoking tobacco or marijuana: no, no vaping    Feather pillow/blanket: no    Gardening: yes    Hobbies: walking, gardening, reading, fishing, travelling    Chemotherapy or radiation therapy: no    Fam hx of ILD: no      Social Determinants of Health     Financial Resource Strain: Not on file   Food Insecurity: Not on file   Transportation Needs: Not on file   Physical Activity: Not on file   Stress: Not on file   Social Connections: Not on file   Interpersonal Safety: Not on file   Housing Stability: Not on file       Family History   Problem Relation Age of Onset    Rheumatoid Arthritis Sister     Interstitial Lung Disease No family hx of          Vitals: There were no vitals taken for this visit.    Exam:   GENERAL APPEARANCE: Well developed, well nourished, alert, and in no apparent distress.  RESP:Good air flow throughout. No bibasilar inspiratory crackles. No rhonchi. No wheezes.   CV: Normal S1, S2, regular rhythm, normal rate. No murmur. No LE edema.   MS: Extremities normal. No clubbing. No cyanosis.  SKIN: No rash on limited exam.  NEURO: Mentation intact, speech normal, normal gait and stance.  PSYCH: Normal/bright affect.    Results:  Recent Results (from the past 168 hour(s))   General PFT Lab (Please always keep checked)    Collection Time: 02/01/24  7:14 AM   Result Value Ref Range    FVC-Pred 3.23 L    FVC-Pre 2.67 L    FVC-%Pred-Pre 82 %    FEV1-Pre 2.50 L    FEV1-%Pred-Pre 96 %    FEV1FVC-Pred 81 %    FEV1FVC-Pre 93 %    FEFMax-Pred 6.79 L/sec    FEFMax-Pre 8.93 L/sec     FEFMax-%Pred-Pre 131 %    FEF2575-Pred 2.46 L/sec    FEF2575-Pre 5.35 L/sec    IYM4308-%Pred-Pre 217 %    ExpTime-Pre 4.94 sec    FIFMax-Pre 4.14 L/sec    VC-Pred 3.48 L    VC-Pre 2.78 L    VC-%Pred-Pre 80 %    IC-Pred 2.39 L    IC-Pre 1.84 L    IC-%Pred-Pre 76 %    ERV-Pred 1.20 L    ERV-Pre 0.94 L    ERV-%Pred-Pre 78 %    FEV1FEV6-Pred 82 %    FEV1FEV6-Pre 93 %    FRCPleth-Pred 2.78 L    FRCPleth-Pre 2.29 L    FRCPleth-%Pred-Pre 82 %    RVPleth-Pred 1.89 L    RVPleth-Pre 1.35 L    RVPleth-%Pred-Pre 71 %    TLCPleth-Pred 5.19 L    TLCPleth-Pre 4.13 L    TLCPleth-%Pred-Pre 79 %    DLCOunc-Pred 20.99 ml/min/mmHg    DLCOunc-Pre 13.81 ml/min/mmHg    DLCOunc-%Pred-Pre 65 %    VA-Pre 3.76 L    VA-%Pred-Pre 75 %    FEV1SVC-Pred 74 %    FEV1SVC-Pre 90 %           FVC FEV1 TLC DLCO   12- [facility] 2.54 71% 2.31 81% 4.01 73% 11.14 52%   4- [facility] 2.85 87% 2.66 101% 4.15 80% 14.22 64%   7- 2.79 86% 2.61 100% 4.40 80% 13.45 63%   10- 2.87 88% 2.64 101% 4.19 80% 13.13 62%   2-1-2024 2.67 82% 2.50 96% 4.13 79% 13.81 65%       Dr. Villarreal and I reviewed the pulmonary function test that was performed today, which shows mild restriction and a moderate diffusion defect.  Lung function is stable and is improved compared to December 2022. Shelby had a minor drop in FVC to 2.67 from 2.87, but her FVC is still within her normal range.    We also reviewed labs today.     We reviewed results with the patient.    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   1.  Rheumatoid arthritis ILD and bronchiolitis.  Shelby has mild dyspnea on exertion that is stable, and PFT is stable with overall improvement compared to December 2022.  She has tolerated the prednisone taper so far, and I will continue prednisone 5 mg daily  and mycophenolate 1000 mg bid.  I encouraged her to do daily physical activity as she is doing.  The goal is to try to get her off prednisone if possible, but this will be a slow process.  If we taper the prednisone too quickly, then there is a risk of flare of her rheumatoid arthritis ILD.  If her ILD worsens, then we have options of increasing prednisone again versus nintedanib for fibrosis versus changing Orencia to tocilizumab versus rituximab.  There is no need to change her medication regimen at this time as she is stable.  She will continue Breo Ellipta for the bronchiolitis (small airways disease).  She should avoid leflunomide (Arava) for rheumatoid arthritis due to the risk of leflunomide lung.  She will return in 3 to 4 months with full PFT.  2.  High risk medication immunosuppression monitoring.  Labs are normal, and I will repeat labs when she comes back to clinic to monitor mycophenolate.  She will continue Bactrim for pneumocystis prophylaxis as long as she is on both mycophenolate and prednisone.  3.  Healthcare maintenance.  I recommended that she get the updated COVID-19 booster vaccine later this month (3 mo after her COVID-19 infection).  She should stop mycophenolate 3 days before the COVID-19 vaccine and restart 7 days after the shot.   I saw and evaluated patient with medical student Daniele Zaldivar.  Case discussed - agree with note.  I reviewed pulmonary function test per my table above.  This shows mild restriction with a moderate diffusion defect.  Lung function is stable and overall improved compared to outside PFT in December 2022.  I also reviewed labs today, and they are within normal limits.  I edited the above note and personally wrote the assessment and plan.  I spent 44 minutes reviewing chart, talking with and examining patient, reviewing test results, formulating plan and documentation on the day of the encounter (excluding PFT review).  JIMENEZ MEJIAS M.D.

## 2024-06-06 ENCOUNTER — OFFICE VISIT (OUTPATIENT)
Dept: PULMONOLOGY | Facility: CLINIC | Age: 54
End: 2024-06-06
Attending: INTERNAL MEDICINE
Payer: COMMERCIAL

## 2024-06-06 ENCOUNTER — LAB (OUTPATIENT)
Dept: LAB | Facility: CLINIC | Age: 54
End: 2024-06-06
Attending: INTERNAL MEDICINE
Payer: COMMERCIAL

## 2024-06-06 VITALS — OXYGEN SATURATION: 98 % | HEART RATE: 62 BPM | DIASTOLIC BLOOD PRESSURE: 80 MMHG | SYSTOLIC BLOOD PRESSURE: 116 MMHG

## 2024-06-06 DIAGNOSIS — J84.9 ILD (INTERSTITIAL LUNG DISEASE) (H): ICD-10-CM

## 2024-06-06 DIAGNOSIS — J84.9 ILD (INTERSTITIAL LUNG DISEASE) (H): Primary | ICD-10-CM

## 2024-06-06 LAB
6 MIN WALK (FT): 1610 FT
6 MIN WALK (M): 491 M
ALBUMIN SERPL BCG-MCNC: 3.9 G/DL (ref 3.5–5.2)
ALP SERPL-CCNC: 44 U/L (ref 40–150)
ALT SERPL W P-5'-P-CCNC: 13 U/L (ref 0–50)
ANION GAP SERPL CALCULATED.3IONS-SCNC: 11 MMOL/L (ref 7–15)
AST SERPL W P-5'-P-CCNC: 19 U/L (ref 0–45)
BASOPHILS # BLD AUTO: 0.1 10E3/UL (ref 0–0.2)
BASOPHILS NFR BLD AUTO: 2 %
BILIRUB SERPL-MCNC: 0.3 MG/DL
BUN SERPL-MCNC: 15.6 MG/DL (ref 6–20)
CALCIUM SERPL-MCNC: 9.2 MG/DL (ref 8.6–10)
CHLORIDE SERPL-SCNC: 105 MMOL/L (ref 98–107)
CREAT SERPL-MCNC: 0.84 MG/DL (ref 0.51–0.95)
DEPRECATED HCO3 PLAS-SCNC: 24 MMOL/L (ref 22–29)
EGFRCR SERPLBLD CKD-EPI 2021: 82 ML/MIN/1.73M2
EOSINOPHIL # BLD AUTO: 0.3 10E3/UL (ref 0–0.7)
EOSINOPHIL NFR BLD AUTO: 5 %
ERYTHROCYTE [DISTWIDTH] IN BLOOD BY AUTOMATED COUNT: 13.1 % (ref 10–15)
GLUCOSE SERPL-MCNC: 94 MG/DL (ref 70–99)
HCT VFR BLD AUTO: 40.2 % (ref 35–47)
HGB BLD-MCNC: 13.3 G/DL (ref 11.7–15.7)
IMM GRANULOCYTES # BLD: 0 10E3/UL
IMM GRANULOCYTES NFR BLD: 1 %
LYMPHOCYTES # BLD AUTO: 1.6 10E3/UL (ref 0.8–5.3)
LYMPHOCYTES NFR BLD AUTO: 21 %
MCH RBC QN AUTO: 31.2 PG (ref 26.5–33)
MCHC RBC AUTO-ENTMCNC: 33.1 G/DL (ref 31.5–36.5)
MCV RBC AUTO: 94 FL (ref 78–100)
MONOCYTES # BLD AUTO: 0.8 10E3/UL (ref 0–1.3)
MONOCYTES NFR BLD AUTO: 11 %
NEUTROPHILS # BLD AUTO: 4.4 10E3/UL (ref 1.6–8.3)
NEUTROPHILS NFR BLD AUTO: 60 %
NRBC # BLD AUTO: 0 10E3/UL
NRBC BLD AUTO-RTO: 0 /100
PLATELET # BLD AUTO: 366 10E3/UL (ref 150–450)
POTASSIUM SERPL-SCNC: 3.9 MMOL/L (ref 3.4–5.3)
PROT SERPL-MCNC: 6.6 G/DL (ref 6.4–8.3)
RBC # BLD AUTO: 4.26 10E6/UL (ref 3.8–5.2)
SODIUM SERPL-SCNC: 140 MMOL/L (ref 135–145)
WBC # BLD AUTO: 7.3 10E3/UL (ref 4–11)

## 2024-06-06 PROCEDURE — 99213 OFFICE O/P EST LOW 20 MIN: CPT | Performed by: INTERNAL MEDICINE

## 2024-06-06 PROCEDURE — 94618 PULMONARY STRESS TESTING: CPT | Performed by: INTERNAL MEDICINE

## 2024-06-06 PROCEDURE — 94729 DIFFUSING CAPACITY: CPT | Performed by: INTERNAL MEDICINE

## 2024-06-06 PROCEDURE — 94375 RESPIRATORY FLOW VOLUME LOOP: CPT | Performed by: INTERNAL MEDICINE

## 2024-06-06 PROCEDURE — 85025 COMPLETE CBC W/AUTO DIFF WBC: CPT | Performed by: PATHOLOGY

## 2024-06-06 PROCEDURE — 94726 PLETHYSMOGRAPHY LUNG VOLUMES: CPT | Performed by: INTERNAL MEDICINE

## 2024-06-06 PROCEDURE — 80053 COMPREHEN METABOLIC PANEL: CPT | Performed by: PATHOLOGY

## 2024-06-06 PROCEDURE — 36415 COLL VENOUS BLD VENIPUNCTURE: CPT | Performed by: PATHOLOGY

## 2024-06-06 PROCEDURE — 99215 OFFICE O/P EST HI 40 MIN: CPT | Mod: 25 | Performed by: INTERNAL MEDICINE

## 2024-06-06 ASSESSMENT — PAIN SCALES - GENERAL: PAINLEVEL: NO PAIN (0)

## 2024-06-06 NOTE — LETTER
"6/6/2024      Shelby Bennett  [address] [facility] [address]      Dear Colleague,    Thank you for referring your patient, Shelby Bennett, to the [facility] FOR LUNG SCIENCE AND [facility] CLINIC [place]. Please see a copy of my visit note below.    [facility] Interstitial Lung Disease Clinic    Reason for Visit  Shelby Bennett is a 53 year old year old female who is being seen for Interstitial Lung Disease (ILD) (4 month follow up )    HPI  Shelby Bennett is a 52 yo female with rheumatoid arthritis-associated interstitial lung disease (fibrotic NSIP with bronchiolitis) who is here for follow-up. She presented in 12/2022 for evaluation of worsening exertional dyspnea to [facility] in [place], and a high resolution CT showed mosaic attenuation and air trapping, traction bronchiectasis, peribronchovascular distribution of groundglass and reticular opacities. She was started on prednisone 40 mg daily. She was seen in our ILD clinic in December 2022, when I started a slow prednisone taper for her rheumatoid arthritis ILD, currently 5 mg daily, and started Breo ellipta for her bronchiolitis.  I started mycophenolate in July 2023 to treat her rheumatoid arthritis ILD as a steroid sparing agent, and she takes 1000 mg twice daily.  Orencia, sulfasalazine, and hydroxychloroquine are her medications for RA.      Today, Shelby reports that her dyspnea on exertion is stable.  She is able to walk up 1 flight of stairs without feeling short of breath, but she does feel a little short of breath when she walks up 2 flight of stairs.  She denies paroxysmal nocturnal dyspnea, wheezing, fevers, syncope or palpitations.  She endorses a cough \"a little.\"  She exercises by walking and doing Pilates.  She reports a couple joint flares of her arthritis.    She continues to work full-time.  She received the updated COVID-19 vaccine in February.  She is taking calcium with vitamin D.  Her DEXA scan was okay.  Her " prednisone dose currently is 5 mg daily, and mycophenolate is 1000 mg twice daily.  She denies nausea, vomiting or diarrhea with the mycophenolate.            Current Outpatient Medications   Medication Sig Dispense Refill     cholecalciferol (VITAMIN D3) 25 mcg (1000 units) capsule Take 1,000 Units by mouth       fluticasone-vilanterol (BREO ELLIPTA) 200-25 MCG/ACT inhaler Inhale 1 puff into the lungs daily gargle with water after use. 3 each 3     hydroxychloroquine (PLAQUENIL) 200 MG tablet Take 200 mg by mouth 2 times daily       lisinopril (ZESTRIL) 5 MG tablet Take 1 tablet by mouth daily       mycophenolate (GENERIC EQUIVALENT) 500 MG tablet Take 2 tablets (1,000 mg) by mouth 2 times daily 360 tablet 3     ORENCIA CLICKJECT 125 MG/ML SOAJ auto-injector        predniSONE (DELTASONE) 5 MG tablet 9 mg daily for 1 month, then 8 mg daily for 1 mo, then stay on 7 mg daily 90 tablet 4     sulfamethoxazole-trimethoprim (BACTRIM DS) 800-160 MG tablet 1 tablet on Mondays Wendesdays and Fridays 36 tablet 4     sulfaSALAzine (AZULFIDINE) 500 MG tablet Take 500 mg by mouth 2 times daily       levothyroxine (SYNTHROID/LEVOTHROID) 200 MCG tablet Take 175 mcg by mouth daily       predniSONE (DELTASONE) 1 MG tablet 9 mg daily for 1 month, then 8 mg daily for 1 mo, then stay on 7 mg daily (take with 5 mg tabs) 360 tablet 4     predniSONE (DELTASONE) 10 MG tablet  (Patient not taking: Reported on 7/14/2023)       predniSONE (DELTASONE) 20 MG tablet 40 mg daily for a total of 4 weeks, then 30 mg daily for 4 weeks, then 20 mg daily for 4 weeks, then 10 mg daily with no further taper (Patient not taking: Reported on 2/1/2024) 280 tablet 0     No current facility-administered medications for this visit.     No Known Allergies  Past Medical History:   Diagnosis Date     ILD (interstitial lung disease) (H)     RA ILD dx 2022     RA (rheumatoid arthritis) (H)     dx 2003; 2022 +RF and CCP, but initially seroneg; methotrexate [id number]        No past surgical history on file.    Social History     Socioeconomic History     Marital status:      Spouse name: Not on file     Number of children: Not on file     Years of education: Not on file     Highest education level: Not on file   Occupational History     Not on file   Tobacco Use     Smoking status: Never     Smokeless tobacco: Never   Substance and Sexual Activity     Alcohol use: Yes     Drug use: Never     Sexual activity: Not on file   Other Topics Concern     Not on file   Social History Narrative    .  .        ILD exposure questions:    Occupation:     Asbestos: no    Silica: no    Mold: no    Pet bird: no    Hot tub: no    Portable humidifier: no    Brass or woodwind instruments: not since high school    Smoking tobacco or marijuana: no, no vaping    Feather pillow/blanket: no    Gardening: yes    Hobbies: walking, gardening, reading, fishing, travelling    Chemotherapy or radiation therapy: no    Fam hx of ILD: no      Social Determinants of Health     Financial Resource Strain: High Risk (1/1/2022)    [facility] [employer] [facility]    Financial Resource Strain      Difficulty of Paying Living Expenses: Not on file      Difficulty of Paying Living Expenses: Not on file   Food Insecurity: Not on file   Transportation Needs: Not on file   Physical Activity: Not on file   Stress: Not on file   Social Connections: Unknown (1/1/2022)    Received from [facility], [employer] [facility]    Social Connections      Frequency of Communication with Friends and Family: Not on file   Interpersonal Safety: Not on file   Housing Stability: Not on file       Family History   Problem Relation Age of Onset     Rheumatoid Arthritis Sister      Interstitial Lung Disease No family hx of              Vitals: /80 (BP Location: Right arm, Patient  Position: Sitting, Cuff Size: Adult Regular)   Pulse 62   SpO2 98%     Exam:   GENERAL APPEARANCE: Well developed, well nourished, alert, and in no apparent distress.  RESP:Good air flow throughout. No bibasilar inspiratory crackles. No rhonchi. No wheezes.   CV: Normal S1, S2, regular rhythm, normal rate. No murmur. No LE edema.   MS: Extremities normal. No clubbing. No cyanosis.  SKIN: No rash on limited exam.  NEURO: Mentation intact, speech normal, normal gait and stance.  PSYCH: Normal/bright affect.      Results:  Recent Results (from the past 168 hour(s))   6 minute walk test    Collection Time: 06/06/24 12:00 AM   Result Value Ref Range    6 min walk (FT) 1,610 1,372 ft    6 Min Walk (M) 491 418 m   Comprehensive metabolic panel    Collection Time: 06/06/24  8:30 AM   Result Value Ref Range    Sodium 140 135 - 145 mmol/L    Potassium 3.9 3.4 - 5.3 mmol/L    Carbon Dioxide (CO2) 24 22 - 29 mmol/L    Anion Gap 11 7 - 15 mmol/L    Urea Nitrogen 15.6 6.0 - 20.0 mg/dL    Creatinine 0.84 0.51 - 0.95 mg/dL    GFR Estimate 82 >60 mL/min/1.73m2    Calcium 9.2 8.6 - 10.0 mg/dL    Chloride 105 98 - 107 mmol/L    Glucose 94 70 - 99 mg/dL    Alkaline Phosphatase 44 40 - 150 U/L    AST 19 0 - 45 U/L    ALT 13 0 - 50 U/L    Protein Total 6.6 6.4 - 8.3 g/dL    Albumin 3.9 3.5 - 5.2 g/dL    Bilirubin Total 0.3 <=1.2 mg/dL   CBC with platelets and differential    Collection Time: 06/06/24  8:30 AM   Result Value Ref Range    WBC Count 7.3 4.0 - 11.0 10e3/uL    RBC Count 4.26 3.80 - 5.20 10e6/uL    Hemoglobin 13.3 11.7 - 15.7 g/dL    Hematocrit 40.2 35.0 - 47.0 %    MCV 94 78 - 100 fL    MCH 31.2 26.5 - 33.0 pg    MCHC 33.1 31.5 - 36.5 g/dL    RDW 13.1 10.0 - 15.0 %    Platelet Count 366 150 - 450 10e3/uL    % Neutrophils 60 %    % Lymphocytes 21 %    % Monocytes 11 %    % Eosinophils 5 %    % Basophils 2 %    % Immature Granulocytes 1 %    NRBCs per 100 WBC 0 <1 /100    Absolute Neutrophils 4.4 1.6 - 8.3 10e3/uL    Absolute  Lymphocytes 1.6 0.8 - 5.3 10e3/uL    Absolute Monocytes 0.8 0.0 - 1.3 10e3/uL    Absolute Eosinophils 0.3 0.0 - 0.7 10e3/uL    Absolute Basophils 0.1 0.0 - 0.2 10e3/uL    Absolute Immature Granulocytes 0.0 <=0.4 10e3/uL    Absolute NRBCs 0.0 10e3/uL   General PFT Lab (Please always keep checked)    Collection Time: 06/06/24  8:35 AM   Result Value Ref Range    FVC-Pred 3.22 L    FVC-Pre 2.69 L    FVC-%Pred-Pre 83 %    FEV1-Pre 2.49 L    FEV1-%Pred-Pre 96 %    FEV1FVC-Pred 81 %    FEV1FVC-Pre 92 %    FEFMax-Pred 6.77 L/sec    FEFMax-Pre 8.85 L/sec    FEFMax-%Pred-Pre 130 %    FEF2575-Pred 2.45 L/sec    FEF2575-Pre 5.70 L/sec    QYS6401-%Pred-Pre 232 %    ExpTime-Pre 5.00 sec    FIFMax-Pre 4.35 L/sec    VC-Pred 3.47 L    VC-Pre 2.80 L    VC-%Pred-Pre 80 %    IC-Pred 2.39 L    IC-Pre 1.97 L    IC-%Pred-Pre 82 %    ERV-Pred 1.19 L    ERV-Pre 0.83 L    ERV-%Pred-Pre 69 %    FEV1FEV6-Pred 82 %    FEV1FEV6-Pre 93 %    FRCPleth-Pred 2.78 L    FRCPleth-Pre 2.42 L    FRCPleth-%Pred-Pre 87 %    RVPleth-Pred 1.89 L    RVPleth-Pre 1.59 L    RVPleth-%Pred-Pre 84 %    TLCPleth-Pred 5.19 L    TLCPleth-Pre 4.40 L    TLCPleth-%Pred-Pre 84 %    DLCOunc-Pred 20.96 ml/min/mmHg    DLCOunc-Pre 11.70 ml/min/mmHg    DLCOunc-%Pred-Pre 55 %    DLCOcor-Pre 11.74 ml/min/mmHg    DLCOcor-%Pred-Pre 56 %    VA-Pre 3.63 L    VA-%Pred-Pre 72 %    FEV1SVC-Pred 74 %    FEV1SVC-Pre 89 %           FVC FEV1 TLC DLCO   12- [facility] 2.54 71% 2.31 81% 4.01 73% 11.14 52%   4- [facility] 2.85 87% 2.66 101% 4.15 80% 14.22 64%   7- 2.79 86% 2.61 100% 4.40 80% 13.45 63%   10- 2.87 88% 2.64 101% 4.19 80% 13.13 62%   2-1-2024 2.67 82% 2.50 96% 4.13 79% 13.81 65%   6-6-2024 2.69 83% 2.49 96% 4.40 84% 11.70 55%       I reviewed pulmonary function test that was performed today.  This shows normal spirometry and lung volumes.  There is a moderate diffusion defect.  Lung function is stable.  FVC was at its highest when she was on higher dose  prednisone.    I also reviewed 6 minute walk test today.  Walk distance is normal on room air without exertional hypoxemia.  Walk distance is significantly improved compared to April 2023.    I also reviewed labs today, and they are within normal limits.    I reviewed results with the patient.          Assessment and plan:  Shelby Bennett is a 54 yo female with rheumatoid arthritis-associated interstitial lung disease (fibrotic NSIP with bronchiolitis) who is here for follow-up.  1.  Rheumatoid arthritis ILD.  Lung function is stable now that her prednisone has been tapered down to 5 mg daily.  FVC was higher when she was on higher dose prednisone, which suggests that her rheumatoid arthritis ILD is responsive to steroids.  We will continue prednisone 5 mg daily, and I would not taper any further.  We will continue mycophenolate 1000 mg twice daily.  If her ILD worsens in the future, then I would consider nintedanib as an antifibrotic or perhaps tocilizumab.  There are case reports of tocilizumab being beneficial for rheumatoid arthritis ILD, and it does treat the arthritis very well.  I encouraged her to continue regular physical activity as she is doing.  She will return in 4 months with full PFT.  2.  Rheumatoid arthritis bronchiolitis (small airways disease).  We will continue Breo Ellipta inhaler 200-25.  3.  High-risk medication immunosuppression monitoring.  Labs are normal today, and I will recheck labs at her next appointment to monitor mycophenolate.  She will continue Bactrim for pneumocystis prophylaxis.  She will continue calcium with vitamin D, and DEXA scan in February was normal.  I did encourage her to start some light weights as a preventative to prevent osteoporosis.  4.  Sit to stand study.  She is currently enrolled.  The longitudinal plan of care for the diagnosis(es)/condition(s) as documented were addressed during this visit. Due to the added complexity in care, I will continue to support Shelby  in the subsequent management and with ongoing continuity of care.    I spent 43 minutes reviewing chart, talking with and examining patient, reviewing test results, formulating plan and documentation on the day of the encounter (excluding PFT review).              Again, thank you for allowing me to participate in the care of your patient.        Sincerely,        Maria Antonia Villarreal MD

## 2024-06-06 NOTE — PROGRESS NOTES
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 She presented in 12/2022 for evaluation of worsening exertional dyspnea to [facility] in [place], and a high resolution CT showed mosaic attenuation and air trapping, traction bronchiectasis, peribronchovascular distribution of groundglass and reticular opacities. She was started on prednisone 40 mg daily. She was seen in our ILD clinic in December 2022, when I started a slow prednisone taper for her rheumatoid arthritis ILD, currently 5 mg daily, and started Breo ellipta for her bronchiolitis.  I started mycophenolate in July 2023 to treat her rheumatoid arthritis ILD as a steroid sparing agent, and she takes 1000 mg twice daily.  Orencia, sulfasalazine, and hydroxychloroquine are her medications for RA.      Today, Shelby reports that her dyspnea on exertion is stable.  She is able to walk up 1 flight of stairs without feeling short of breath, but she does feel a little short of breath when she walks up 2 flight of stairs.  She denies paroxysmal nocturnal dyspnea, wheezing, fevers, syncope or palpitations.  She endorses a cough \"a little.\"  She exercises by walking and doing Pilates.  She reports a couple joint flares of her arthritis.    She continues to work full-time.  She received the updated COVID-19 vaccine in February.  She is taking calcium with vitamin D.  Her DEXA scan was okay.  Her prednisone dose currently is 5 mg daily, and mycophenolate is 1000 mg twice daily.  She denies nausea, vomiting or diarrhea with the mycophenolate.            Current Outpatient Medications   Medication Sig Dispense Refill    cholecalciferol (VITAMIN D3) 25 mcg (1000 " units) capsule Take 1,000 Units by mouth      fluticasone-vilanterol (BREO ELLIPTA) 200-25 MCG/ACT inhaler Inhale 1 puff into the lungs daily gargle with water after use. 3 each 3    hydroxychloroquine (PLAQUENIL) 200 MG tablet Take 200 mg by mouth 2 times daily      lisinopril (ZESTRIL) 5 MG tablet Take 1 tablet by mouth daily      mycophenolate (GENERIC EQUIVALENT) 500 MG tablet Take 2 tablets (1,000 mg) by mouth 2 times daily 360 tablet 3    ORENCIA CLICKJECT 125 MG/ML SOAJ auto-injector       predniSONE (DELTASONE) 5 MG tablet 9 mg daily for 1 month, then 8 mg daily for 1 mo, then stay on 7 mg daily 90 tablet 4    sulfamethoxazole-trimethoprim (BACTRIM DS) 800-160 MG tablet 1 tablet on Mondays Wendesdays and Fridays 36 tablet 4    sulfaSALAzine (AZULFIDINE) 500 MG tablet Take 500 mg by mouth 2 times daily      levothyroxine (SYNTHROID/LEVOTHROID) 200 MCG tablet Take 175 mcg by mouth daily      predniSONE (DELTASONE) 1 MG tablet 9 mg daily for 1 month, then 8 mg daily for 1 mo, then stay on 7 mg daily (take with 5 mg tabs) 360 tablet 4    predniSONE (DELTASONE) 10 MG tablet  (Patient not taking: Reported on 7/14/2023)      predniSONE (DELTASONE) 20 MG tablet 40 mg daily for a total of 4 weeks, then 30 mg daily for 4 weeks, then 20 mg daily for 4 weeks, then 10 mg daily with no further taper (Patient not taking: Reported on 2/1/2024) 280 tablet 0     No current facility-administered medications for this visit.     No Known Allergies  Past Medical History:   Diagnosis Date    ILD (interstitial lung disease) (H)     RA ILD dx 2022    RA (rheumatoid arthritis) (H)     dx 2003; 2022 +RF and CCP, but initially seroneg; methotrexate 1723-1816       No past surgical history on file.    Social History     Socioeconomic History    Marital status:      Spouse name: Not on file    Number of children: Not on file    Years of education: Not on file    Highest education level: Not on file   Occupational History    Not on  file   Tobacco Use    Smoking status: Never    Smokeless tobacco: Never   Substance and Sexual Activity    Alcohol use: Yes    Drug use: Never    Sexual activity: Not on file   Other Topics Concern    Not on file   Social History Narrative    .  .        ILD exposure questions:    Occupation:     Asbestos: no    Silica: no    Mold: no    Pet bird: no    Hot tub: no    Portable humidifier: no    Brass or woodwind instruments: not since high school    Smoking tobacco or marijuana: no, no vaping    Feather pillow/blanket: no    Gardening: yes    Hobbies: walking, gardening, reading, fishing, travelling    Chemotherapy or radiation therapy: no    Fam hx of ILD: no      Social Determinants of Health     Financial Resource Strain: High Risk (1/1/2022)    Received from [facility]    Financial Resource Strain     Difficulty of Paying Living Expenses: Not on file     Difficulty of Paying Living Expenses: Not on file   Food Insecurity: Not on file   Transportation Needs: Not on file   Physical Activity: Not on file   Stress: Not on file   Social Connections: Unknown (1/1/2022)    Received from [facility]    Social Connections     Frequency of Communication with Friends and Family: Not on file   Interpersonal Safety: Not on file   Housing Stability: Not on file       Family History   Problem Relation Age of Onset    Rheumatoid Arthritis Sister     Interstitial Lung Disease No family hx of              Vitals: /80 (BP Location: Right arm, Patient Position: Sitting, Cuff Size: Adult Regular)   Pulse 62   SpO2 98%     Exam:   GENERAL APPEARANCE: Well developed, well nourished, alert, and in no apparent distress.  RESP:Good air flow throughout. No bibasilar inspiratory crackles. No rhonchi. No wheezes.   CV: Normal S1, S2, regular rhythm,  normal rate. No murmur. No LE edema.   MS: Extremities normal. No clubbing. No cyanosis.  SKIN: No rash on limited exam.  NEURO: Mentation intact, speech normal, normal gait and stance.  PSYCH: Normal/bright affect.      Results:  Recent Results (from the past 168 hour(s))   6 minute walk test    Collection Time: 06/06/24 12:00 AM   Result Value Ref Range    6 min walk (FT) 1,610 1,372 ft    6 Min Walk (M) 491 418 m   Comprehensive metabolic panel    Collection Time: 06/06/24  8:30 AM   Result Value Ref Range    Sodium 140 135 - 145 mmol/L    Potassium 3.9 3.4 - 5.3 mmol/L    Carbon Dioxide (CO2) 24 22 - 29 mmol/L    Anion Gap 11 7 - 15 mmol/L    Urea Nitrogen 15.6 6.0 - 20.0 mg/dL    Creatinine 0.84 0.51 - 0.95 mg/dL    GFR Estimate 82 >60 mL/min/1.73m2    Calcium 9.2 8.6 - 10.0 mg/dL    Chloride 105 98 - 107 mmol/L    Glucose 94 70 - 99 mg/dL    Alkaline Phosphatase 44 40 - 150 U/L    AST 19 0 - 45 U/L    ALT 13 0 - 50 U/L    Protein Total 6.6 6.4 - 8.3 g/dL    Albumin 3.9 3.5 - 5.2 g/dL    Bilirubin Total 0.3 <=1.2 mg/dL   CBC with platelets and differential    Collection Time: 06/06/24  8:30 AM   Result Value Ref Range    WBC Count 7.3 4.0 - 11.0 10e3/uL    RBC Count 4.26 3.80 - 5.20 10e6/uL    Hemoglobin 13.3 11.7 - 15.7 g/dL    Hematocrit 40.2 35.0 - 47.0 %    MCV 94 78 - 100 fL    MCH 31.2 26.5 - 33.0 pg    MCHC 33.1 31.5 - 36.5 g/dL    RDW 13.1 10.0 - 15.0 %    Platelet Count 366 150 - 450 10e3/uL    % Neutrophils 60 %    % Lymphocytes 21 %    % Monocytes 11 %    % Eosinophils 5 %    % Basophils 2 %    % Immature Granulocytes 1 %    NRBCs per 100 WBC 0 <1 /100    Absolute Neutrophils 4.4 1.6 - 8.3 10e3/uL    Absolute Lymphocytes 1.6 0.8 - 5.3 10e3/uL    Absolute Monocytes 0.8 0.0 - 1.3 10e3/uL    Absolute Eosinophils 0.3 0.0 - 0.7 10e3/uL    Absolute Basophils 0.1 0.0 - 0.2 10e3/uL    Absolute Immature Granulocytes 0.0 <=0.4 10e3/uL    Absolute NRBCs 0.0 10e3/uL   General PFT Lab (Please always keep checked)     Collection Time: 06/06/24  8:35 AM   Result Value Ref Range    FVC-Pred 3.22 L    FVC-Pre 2.69 L    FVC-%Pred-Pre 83 %    FEV1-Pre 2.49 L    FEV1-%Pred-Pre 96 %    FEV1FVC-Pred 81 %    FEV1FVC-Pre 92 %    FEFMax-Pred 6.77 L/sec    FEFMax-Pre 8.85 L/sec    FEFMax-%Pred-Pre 130 %    FEF2575-Pred 2.45 L/sec    FEF2575-Pre 5.70 L/sec    RNW3776-%Pred-Pre 232 %    ExpTime-Pre 5.00 sec    FIFMax-Pre 4.35 L/sec    VC-Pred 3.47 L    VC-Pre 2.80 L    VC-%Pred-Pre 80 %    IC-Pred 2.39 L    IC-Pre 1.97 L    IC-%Pred-Pre 82 %    ERV-Pred 1.19 L    ERV-Pre 0.83 L    ERV-%Pred-Pre 69 %    FEV1FEV6-Pred 82 %    FEV1FEV6-Pre 93 %    FRCPleth-Pred 2.78 L    FRCPleth-Pre 2.42 L    FRCPleth-%Pred-Pre 87 %    RVPleth-Pred 1.89 L    RVPleth-Pre 1.59 L    RVPleth-%Pred-Pre 84 %    TLCPleth-Pred 5.19 L    TLCPleth-Pre 4.40 L    TLCPleth-%Pred-Pre 84 %    DLCOunc-Pred 20.96 ml/min/mmHg    DLCOunc-Pre 11.70 ml/min/mmHg    DLCOunc-%Pred-Pre 55 %    DLCOcor-Pre 11.74 ml/min/mmHg    DLCOcor-%Pred-Pre 56 %    VA-Pre 3.63 L    VA-%Pred-Pre 72 %    FEV1SVC-Pred 74 %    FEV1SVC-Pre 89 %           FVC FEV1 TLC DLCO   12- [facility] 2.54 71% 2.31 81% 4.01 73% 11.14 52%   4- [facility] 2.85 87% 2.66 101% 4.15 80% 14.22 64%   7- 2.79 86% 2.61 100% 4.40 80% 13.45 63%   10- 2.87 88% 2.64 101% 4.19 80% 13.13 62%   2-1-2024 2.67 82% 2.50 96% 4.13 79% 13.81 65%   6-6-2024 2.69 83% 2.49 96% 4.40 84% 11.70 55%       I reviewed pulmonary function test that was performed today.  This shows normal spirometry and lung volumes.  There is a moderate diffusion defect.  Lung function is stable.  FVC was at its highest when she was on higher dose prednisone.    I also reviewed 6 minute walk test today.  Walk distance is normal on room air without exertional hypoxemia.  Walk distance is significantly improved compared to April 2023.    I also reviewed labs today, and they are within normal limits.    I reviewed results with the  patient.          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  1.  Rheumatoid arthritis ILD.  Lung function is stable now that her prednisone has been tapered down to 5 mg daily.  FVC was higher when she was on higher dose prednisone, which suggests that her rheumatoid arthritis ILD is responsive to steroids.  We will continue prednisone 5 mg daily, and I would not taper any further.  We will continue mycophenolate 1000 mg twice daily.  If her ILD worsens in the future, then I would consider nintedanib as an antifibrotic or perhaps tocilizumab.  There are case reports of tocilizumab being beneficial for rheumatoid arthritis ILD, and it does treat the arthritis very well.  I encouraged her to continue regular physical activity as she is doing.  She will return in 4 months with full PFT.  2.  Rheumatoid arthritis bronchiolitis (small airways disease).  We will continue Breo Ellipta inhaler 200-25.  3.  High-risk medication immunosuppression monitoring.  Labs are normal today, and I will recheck labs at her next appointment to monitor mycophenolate.  She will continue Bactrim for pneumocystis prophylaxis.  She will continue calcium with vitamin D, and DEXA scan in February was normal.  I did encourage her to start some light weights as a preventative to prevent osteoporosis.  4.  Sit to stand study.  She is currently enrolled.  The longitudinal plan of care for the diagnosis(es)/condition(s) as documented were addressed during this visit. Due to the added complexity in care, I will continue to support Shelby in the subsequent management and with ongoing continuity of care.    I spent 43 minutes reviewing chart, talking with and examining patient, reviewing test results, formulating plan and documentation on the day of the encounter (excluding PFT review).

## 2024-06-06 NOTE — NURSING NOTE
Chief Complaint   Patient presents with    Interstitial Lung Disease (ILD)     4 month follow up      Vitals were taken and medications were reconciled.    Suha Macias RMA  9:31 AM

## 2024-06-06 NOTE — PROGRESS NOTES
Shelby Bennett comes into clinic today at the request of Dr Villarreal , for 6 minute walk    Tolerated testing well. No adverse reactions. Left lab in no distress.        ANGELITO OLVERA

## 2024-06-06 NOTE — PATIENT INSTRUCTIONS
Get the flu and the updated COVID-19 vaccines in the fall - October  Continue prednisone and mycophenolate and bactrim  Start light weights  Continue walking  Restart higher dose vit D

## 2024-06-06 NOTE — PROGRESS NOTES
Shelby Bennett comes into clinic today at the request of Dr Villarreal , for PFT    Tolerated testing well. No adverse reactions. Left lab in no distress.        ANGELITO OLVERA

## 2024-06-07 LAB
DLCOCOR-%PRED-PRE: 56 %
DLCOCOR-PRE: 11.74 ML/MIN/MMHG
DLCOUNC-%PRED-PRE: 55 %
DLCOUNC-PRE: 11.7 ML/MIN/MMHG
DLCOUNC-PRED: 20.96 ML/MIN/MMHG
ERV-%PRED-PRE: 69 %
ERV-PRE: 0.83 L
ERV-PRED: 1.19 L
EXPTIME-PRE: 5 SEC
FEF2575-%PRED-PRE: 232 %
FEF2575-PRE: 5.7 L/SEC
FEF2575-PRED: 2.45 L/SEC
FEFMAX-%PRED-PRE: 130 %
FEFMAX-PRE: 8.85 L/SEC
FEFMAX-PRED: 6.77 L/SEC
FEV1-%PRED-PRE: 96 %
FEV1-PRE: 2.49 L
FEV1FEV6-PRE: 93 %
FEV1FEV6-PRED: 82 %
FEV1FVC-PRE: 92 %
FEV1FVC-PRED: 81 %
FEV1SVC-PRE: 89 %
FEV1SVC-PRED: 74 %
FIFMAX-PRE: 4.35 L/SEC
FRCPLETH-%PRED-PRE: 87 %
FRCPLETH-PRE: 2.42 L
FRCPLETH-PRED: 2.78 L
FVC-%PRED-PRE: 83 %
FVC-PRE: 2.69 L
FVC-PRED: 3.22 L
IC-%PRED-PRE: 82 %
IC-PRE: 1.97 L
IC-PRED: 2.39 L
RVPLETH-%PRED-PRE: 84 %
RVPLETH-PRE: 1.59 L
RVPLETH-PRED: 1.89 L
TLCPLETH-%PRED-PRE: 84 %
TLCPLETH-PRE: 4.4 L
TLCPLETH-PRED: 5.19 L
VA-%PRED-PRE: 72 %
VA-PRE: 3.63 L
VC-%PRED-PRE: 80 %
VC-PRE: 2.8 L
VC-PRED: 3.47 L

## 2024-07-14 DIAGNOSIS — J84.9 INTERSTITIAL LUNG DISEASE (H): ICD-10-CM

## 2024-07-14 DIAGNOSIS — J84.9 ILD (INTERSTITIAL LUNG DISEASE) (H): ICD-10-CM

## 2024-07-14 DIAGNOSIS — M06.9 RHEUMATOID ARTHRITIS, INVOLVING UNSPECIFIED SITE, UNSPECIFIED WHETHER RHEUMATOID FACTOR PRESENT (H): ICD-10-CM

## 2024-07-15 RX ORDER — MYCOPHENOLATE MOFETIL 500 MG/1
TABLET ORAL
Qty: 360 TABLET | Refills: 3 | Status: SHIPPED | OUTPATIENT
Start: 2024-07-15

## 2024-07-30 DIAGNOSIS — J84.9 ILD (INTERSTITIAL LUNG DISEASE) (H): ICD-10-CM

## 2024-07-31 RX ORDER — FLUTICASONE FUROATE AND VILANTEROL TRIFENATATE 200; 25 UG/1; UG/1
POWDER RESPIRATORY (INHALATION)
Qty: 60 EACH | Refills: 11 | Status: SHIPPED | OUTPATIENT
Start: 2024-07-31

## 2024-08-13 ENCOUNTER — TRANSFERRED RECORDS (OUTPATIENT)
Dept: HEALTH INFORMATION MANAGEMENT | Facility: CLINIC | Age: 54
End: 2024-08-13
Payer: COMMERCIAL

## 2024-08-13 LAB
ALT SERPL-CCNC: 14 IU/L (ref 6–32)
AST SERPL-CCNC: 21 U/L (ref 10–35)
CREATININE (EXTERNAL): 0.9 MG/DL (ref 0.5–1.05)

## 2024-08-15 ENCOUNTER — TELEPHONE (OUTPATIENT)
Dept: PULMONOLOGY | Facility: CLINIC | Age: 54
End: 2024-08-15
Payer: COMMERCIAL

## 2024-08-15 NOTE — TELEPHONE ENCOUNTER
[facility] Call Center    Phone Message    May a detailed message be left on voicemail: yes     Reason for Call: Other: Betzaida call regarding medication for mutual pt. Caller stated they need approval for Ly, since the 2 medication that Dr Villarreal put in was not approve by pt insure. They were wondering if Dr Villarreal will approve for Josézara. Per Betzaida please follow-up with her regarding this concern. Betzaida is reachable today and won't be back in office until Aug 19. Please follow-up. Thank you       Action Taken: Other: Pulm    Travel Screening: Not Applicable     Date of Service:

## 2024-08-16 NOTE — TELEPHONE ENCOUNTER
Per Dr. Villarreal, the medication is not needed for ILD, but it is ok with her for pt to have Kevzara if it is to treat arthritis.    Called [facility], they were unable to get hold of Betzaida. Call center sent message to her to call ILD nurse line [phone].     Jesusita Drew, RN, BSN  ILD Nurse   [phone]

## 2024-08-19 NOTE — TELEPHONE ENCOUNTER
Spoke to Betzaida at [facility]. Relayed message from Dr. Villarreal: Kevzara is not needed for ILD, but it is ok with her for pt to have the medication if it is to treat arthritis. Betzaida expressed understanding, had no further questions; plans to proceed with Kevzara for pt arthritis.     Jesusita Drew, RN, BSN  ILD Nurse   [phone]

## 2024-10-03 ENCOUNTER — LAB (OUTPATIENT)
Dept: LAB | Facility: CLINIC | Age: 54
End: 2024-10-03
Attending: INTERNAL MEDICINE
Payer: COMMERCIAL

## 2024-10-03 ENCOUNTER — OFFICE VISIT (OUTPATIENT)
Dept: PULMONOLOGY | Facility: CLINIC | Age: 54
End: 2024-10-03
Attending: INTERNAL MEDICINE
Payer: COMMERCIAL

## 2024-10-03 VITALS
SYSTOLIC BLOOD PRESSURE: 121 MMHG | HEART RATE: 62 BPM | HEIGHT: 66 IN | BODY MASS INDEX: 29.25 KG/M2 | OXYGEN SATURATION: 97 % | DIASTOLIC BLOOD PRESSURE: 78 MMHG | WEIGHT: 182 LBS

## 2024-10-03 DIAGNOSIS — J84.9 ILD (INTERSTITIAL LUNG DISEASE) (H): ICD-10-CM

## 2024-10-03 DIAGNOSIS — J84.9 ILD (INTERSTITIAL LUNG DISEASE) (H): Primary | ICD-10-CM

## 2024-10-03 LAB
ALBUMIN SERPL BCG-MCNC: 4.2 G/DL (ref 3.5–5.2)
ALP SERPL-CCNC: 45 U/L (ref 40–150)
ALT SERPL W P-5'-P-CCNC: 19 U/L (ref 0–50)
ANION GAP SERPL CALCULATED.3IONS-SCNC: 9 MMOL/L (ref 7–15)
AST SERPL W P-5'-P-CCNC: 27 U/L (ref 0–45)
BASOPHILS # BLD AUTO: 0.1 10E3/UL (ref 0–0.2)
BASOPHILS NFR BLD AUTO: 2 %
BILIRUB SERPL-MCNC: 0.4 MG/DL
BUN SERPL-MCNC: 14 MG/DL (ref 6–20)
CALCIUM SERPL-MCNC: 9.1 MG/DL (ref 8.8–10.4)
CHLORIDE SERPL-SCNC: 102 MMOL/L (ref 98–107)
CREAT SERPL-MCNC: 0.96 MG/DL (ref 0.51–0.95)
EGFRCR SERPLBLD CKD-EPI 2021: 70 ML/MIN/1.73M2
EOSINOPHIL # BLD AUTO: 0.2 10E3/UL (ref 0–0.7)
EOSINOPHIL NFR BLD AUTO: 4 %
ERYTHROCYTE [DISTWIDTH] IN BLOOD BY AUTOMATED COUNT: 13.3 % (ref 10–15)
GLUCOSE SERPL-MCNC: 56 MG/DL (ref 70–99)
HCO3 SERPL-SCNC: 28 MMOL/L (ref 22–29)
HCT VFR BLD AUTO: 43.6 % (ref 35–47)
HGB BLD-MCNC: 14.3 G/DL (ref 11.7–15.7)
IMM GRANULOCYTES # BLD: 0 10E3/UL
IMM GRANULOCYTES NFR BLD: 1 %
LYMPHOCYTES # BLD AUTO: 1.5 10E3/UL (ref 0.8–5.3)
LYMPHOCYTES NFR BLD AUTO: 26 %
MCH RBC QN AUTO: 31.6 PG (ref 26.5–33)
MCHC RBC AUTO-ENTMCNC: 32.8 G/DL (ref 31.5–36.5)
MCV RBC AUTO: 97 FL (ref 78–100)
MONOCYTES # BLD AUTO: 0.7 10E3/UL (ref 0–1.3)
MONOCYTES NFR BLD AUTO: 11 %
NEUTROPHILS # BLD AUTO: 3.2 10E3/UL (ref 1.6–8.3)
NEUTROPHILS NFR BLD AUTO: 56 %
NRBC # BLD AUTO: 0 10E3/UL
NRBC BLD AUTO-RTO: 0 /100
PLATELET # BLD AUTO: 253 10E3/UL (ref 150–450)
POTASSIUM SERPL-SCNC: 3.6 MMOL/L (ref 3.4–5.3)
PROT SERPL-MCNC: 6.7 G/DL (ref 6.4–8.3)
RBC # BLD AUTO: 4.52 10E6/UL (ref 3.8–5.2)
SODIUM SERPL-SCNC: 139 MMOL/L (ref 135–145)
WBC # BLD AUTO: 5.7 10E3/UL (ref 4–11)

## 2024-10-03 PROCEDURE — 85025 COMPLETE CBC W/AUTO DIFF WBC: CPT | Performed by: PATHOLOGY

## 2024-10-03 PROCEDURE — 94150 VITAL CAPACITY TEST: CPT | Performed by: INTERNAL MEDICINE

## 2024-10-03 PROCEDURE — 94375 RESPIRATORY FLOW VOLUME LOOP: CPT | Performed by: INTERNAL MEDICINE

## 2024-10-03 PROCEDURE — 94729 DIFFUSING CAPACITY: CPT | Performed by: INTERNAL MEDICINE

## 2024-10-03 PROCEDURE — 80053 COMPREHEN METABOLIC PANEL: CPT | Performed by: PATHOLOGY

## 2024-10-03 PROCEDURE — 99215 OFFICE O/P EST HI 40 MIN: CPT | Mod: 25 | Performed by: INTERNAL MEDICINE

## 2024-10-03 PROCEDURE — 99213 OFFICE O/P EST LOW 20 MIN: CPT | Performed by: INTERNAL MEDICINE

## 2024-10-03 PROCEDURE — 94726 PLETHYSMOGRAPHY LUNG VOLUMES: CPT | Performed by: INTERNAL MEDICINE

## 2024-10-03 PROCEDURE — 99207 PR NO CHARGE LOS: CPT

## 2024-10-03 PROCEDURE — 36415 COLL VENOUS BLD VENIPUNCTURE: CPT | Performed by: PATHOLOGY

## 2024-10-03 RX ORDER — GINGER ROOT/GINGER ROOT EXT 262.5 MG
CAPSULE ORAL
COMMUNITY
Start: 2024-06-03

## 2024-10-03 RX ORDER — FERROUS SULFATE 325(65) MG
TABLET ORAL
COMMUNITY

## 2024-10-03 RX ORDER — SARILUMAB 200 MG/1.14ML
INJECTION, SOLUTION SUBCUTANEOUS
COMMUNITY
Start: 2024-08-21

## 2024-10-03 RX ORDER — LEVOTHYROXINE SODIUM 175 UG/1
175 TABLET ORAL DAILY
COMMUNITY
Start: 2024-01-02

## 2024-10-03 ASSESSMENT — PAIN SCALES - GENERAL: PAINLEVEL: NO PAIN (0)

## 2024-10-03 NOTE — LETTER
10/3/2024      Shelby Bennett  [address] [facility] [address]      Dear Colleague,    Thank you for referring your patient, Shelby Bennett, to the [facility] FOR LUNG SCIENCE AND [facility] [place]. Please see a copy of my visit note below.    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 She presented in 12/2022 for evaluation of worsening exertional dyspnea to [facility] in [place], and a high resolution CT showed mosaic attenuation and air trapping, traction bronchiectasis, peribronchovascular distribution of groundglass and reticular opacities. She was started on prednisone 40 mg daily. She was seen in our ILD clinic in December 2022, when I started a slow prednisone taper for her rheumatoid arthritis ILD, currently 5 mg daily, and started Breo ellipta for her bronchiolitis.  I started mycophenolate in July 2023 to treat her rheumatoid arthritis ILD as a steroid sparing agent, and she takes 1000 mg twice daily.  For her rheumatoid arthritis, she takes sulfasalazine and hydroxychloroquine.  Orencia was changed Kevzara (sarilumab, an IL-6 inhibitor).      Shelby reports having had several joint flares this summer.  We had discussed changing Orencia to tocilizumab for better control of her rheumatoid arthritis, but her insurance would not pay for this.  She instead started Kevzara (sarilumab), which is another IL-6 inhibitor.  She started this a few weeks ago.  She continues on prednisone 5 mg daily, mycophenolate 1000 mg twice a day and Breo Ellipta inhaler.    She reports an episode a month ago of increased cough with squeaking and a little dyspnea on exertion.  She took Flonase, and her  symptoms improved.  She exercises by walking and doing Pilates, and denies dyspnea on exertion when exercising.  She can walk up a flight of stairs without feeling short of breath, but she does endorse some shortness of breath when she walks uphill.  She averages 8-14,000 steps per day.    She did receive both the flu vaccine and the new COVID-19 vaccine.  She reports that she had a liver biopsy in 2007, and I was able to find the report in Care everywhere.  This was a core liver biopsy that showed granulomatous inflammation by report.  Chest x-ray at that time was normal by the report I see in Care Everywhere.  She also had abnormal kidney function, and they were unclear of her diagnosis but felt that she might have sarcoidosis despite normal chest x-ray.  Her kidney function improved, and she has had no problem with her kidneys for many years.            Current Outpatient Medications   Medication Sig Dispense Refill     Calcium Carb-Cholecalciferol (CALTRATE BONE HEALTH) 600-20 MG-MCG TABS        ferrous sulfate (FEROSUL) 325 (65 Fe) MG tablet        fluticasone-vilanterol (BREO ELLIPTA) 200-25 MCG/ACT inhaler INHALE 1 PUFF INTO THE LUNGS DAILY GARGLE WITH WATER AFTER USE. 60 each 11     hydroxychloroquine (PLAQUENIL) 200 MG tablet Take 200 mg by mouth 2 times daily       KEVZARA 200 MG/1.14ML SOAJ        levothyroxine (SYNTHROID/LEVOTHROID) 175 MCG tablet Take 175 mcg by mouth daily.       lisinopril (ZESTRIL) 5 MG tablet Take 1 tablet by mouth daily       mycophenolate (GENERIC EQUIVALENT) 500 MG tablet TAKE 2 TABLETS TWO TIMES A  tablet 3     predniSONE (DELTASONE) 5 MG tablet 9 mg daily for 1 month, then 8 mg daily for 1 mo, then stay on 7 mg daily (Patient taking differently: Take 5 mg by mouth daily.) 90 tablet 4     sulfamethoxazole-trimethoprim (BACTRIM DS) 800-160 MG tablet 1 tablet on Mondays Wendesdays and Fridays 36 tablet 4     sulfaSALAzine (AZULFIDINE) 500 MG tablet Take 500 mg by mouth 2  times daily       No current facility-administered medications for this visit.     No Known Allergies  Past Medical History:   Diagnosis Date     ILD (interstitial lung disease) (H)     RA ILD dx 2022     RA (rheumatoid arthritis) (H)     dx 2003; 2022 +RF and CCP, but initially seroneg; methotrexate [id number]       History reviewed. No pertinent surgical history.    Social History     Socioeconomic History     Marital status:      Spouse name: Not on file     Number of children: Not on file     Years of education: Not on file     Highest education level: Not on file   Occupational History     Not on file   Tobacco Use     Smoking status: Never     Smokeless tobacco: Never   Substance and Sexual Activity     Alcohol use: Yes     Drug use: Never     Sexual activity: Not on file   Other Topics Concern     Not on file   Social History Narrative    .  .        ILD exposure questions:    Occupation:     Asbestos: no    Silica: no    Mold: no    Pet bird: no    Hot tub: no    Portable humidifier: no    Brass or woodwind instruments: not since high school    Smoking tobacco or marijuana: no, no vaping    Feather pillow/blanket: no    Gardening: yes    Hobbies: walking, gardening, reading, fishing, travelling    Chemotherapy or radiation therapy: no    Fam hx of ILD: no      Social Determinants of Health     Financial Resource Strain: High Risk (1/1/2022)    Received from [employer] [facility] [employer] [facility]    Financial Resource Strain      Difficulty of Paying Living Expenses: Not on file      Difficulty of Paying Living Expenses: Not on file   Food Insecurity: Not on file   Transportation Needs: Not on file   Physical Activity: Not on file   Stress: Not on file   Social Connections: Unknown (1/1/2022)    [facility]    Social Connections       "Frequency of Communication with Friends and Family: Not on file   Interpersonal Safety: Not on file   Housing Stability: Not on file       Family History   Problem Relation Age of Onset     Rheumatoid Arthritis Sister      Interstitial Lung Disease No family hx of            Vitals: /78   Pulse 62   Ht 1.664 m (5' 5.5\")   Wt 82.6 kg (182 lb)   SpO2 97%   BMI 29.83 kg/m      Exam:   GENERAL APPEARANCE: Well developed, well nourished, alert, and in no apparent distress.  RESP:Good air flow throughout. No inspiratory crackles. No rhonchi. No wheezes.  No inspiratory squeaks.  CV: Normal S1, S2, regular rhythm, normal rate. No murmur. No LE edema.   MS: Extremities normal. No clubbing. No cyanosis.  SKIN: No rash on limited exam.  NEURO: Mentation intact, speech normal, normal gait and stance.  PSYCH: Normal/bright affect.      Results:  Recent Results (from the past 168 hour(s))   Comprehensive metabolic panel    Collection Time: 10/03/24  9:15 AM   Result Value Ref Range    Sodium 139 135 - 145 mmol/L    Potassium 3.6 3.4 - 5.3 mmol/L    Carbon Dioxide (CO2) 28 22 - 29 mmol/L    Anion Gap 9 7 - 15 mmol/L    Urea Nitrogen 14.0 6.0 - 20.0 mg/dL    Creatinine 0.96 (H) 0.51 - 0.95 mg/dL    GFR Estimate 70 >60 mL/min/1.73m2    Calcium 9.1 8.8 - 10.4 mg/dL    Chloride 102 98 - 107 mmol/L    Glucose 56 (L) 70 - 99 mg/dL    Alkaline Phosphatase 45 40 - 150 U/L    AST 27 0 - 45 U/L    ALT 19 0 - 50 U/L    Protein Total 6.7 6.4 - 8.3 g/dL    Albumin 4.2 3.5 - 5.2 g/dL    Bilirubin Total 0.4 <=1.2 mg/dL   CBC with platelets and differential    Collection Time: 10/03/24  9:15 AM   Result Value Ref Range    WBC Count 5.7 4.0 - 11.0 10e3/uL    RBC Count 4.52 3.80 - 5.20 10e6/uL    Hemoglobin 14.3 11.7 - 15.7 g/dL    Hematocrit 43.6 35.0 - 47.0 %    MCV 97 78 - 100 fL    MCH 31.6 26.5 - 33.0 pg    MCHC 32.8 31.5 - 36.5 g/dL    RDW 13.3 10.0 - 15.0 %    Platelet Count 253 150 - 450 10e3/uL    % Neutrophils 56 %    % " Lymphocytes 26 %    % Monocytes 11 %    % Eosinophils 4 %    % Basophils 2 %    % Immature Granulocytes 1 %    NRBCs per 100 WBC 0 <1 /100    Absolute Neutrophils 3.2 1.6 - 8.3 10e3/uL    Absolute Lymphocytes 1.5 0.8 - 5.3 10e3/uL    Absolute Monocytes 0.7 0.0 - 1.3 10e3/uL    Absolute Eosinophils 0.2 0.0 - 0.7 10e3/uL    Absolute Basophils 0.1 0.0 - 0.2 10e3/uL    Absolute Immature Granulocytes 0.0 <=0.4 10e3/uL    Absolute NRBCs 0.0 10e3/uL   General PFT Lab (Please always keep checked)    Collection Time: 10/03/24  9:24 AM   Result Value Ref Range    FVC-Pred 3.21 L    FVC-Pre 2.70 L    FVC-%Pred-Pre 84 %    FEV1-Pre 2.53 L    FEV1-%Pred-Pre 98 %    FEV1FVC-Pred 81 %    FEV1FVC-Pre 94 %    FEFMax-Pred 6.76 L/sec    FEFMax-Pre 9.17 L/sec    FEFMax-%Pred-Pre 135 %    FEF2575-Pred 2.43 L/sec    FEF2575-Pre 5.58 L/sec    EJT1587-%Pred-Pre 229 %    ExpTime-Pre 4.29 sec    FIFMax-Pre 4.38 L/sec    VC-Pred 3.47 L    VC-Pre 2.96 L    VC-%Pred-Pre 85 %    IC-Pred 2.38 L    IC-Pre 1.85 L    IC-%Pred-Pre 77 %    ERV-Pred 1.19 L    ERV-Pre 1.11 L    ERV-%Pred-Pre 92 %    FEV1FEV6-Pred 81 %    FEV1FEV6-Pre 94 %    FRCPleth-Pred 2.78 L    FRCPleth-Pre 2.63 L    FRCPleth-%Pred-Pre 94 %    RVPleth-Pred 1.90 L    RVPleth-Pre 1.52 L    RVPleth-%Pred-Pre 80 %    TLCPleth-Pred 5.19 L    TLCPleth-Pre 4.48 L    TLCPleth-%Pred-Pre 86 %    DLCOunc-Pred 20.93 ml/min/mmHg    DLCOunc-Pre 12.58 ml/min/mmHg    DLCOunc-%Pred-Pre 60 %    DLCOcor-Pre 12.26 ml/min/mmHg    DLCOcor-%Pred-Pre 58 %    VA-Pre 3.68 L    VA-%Pred-Pre 73 %    FEV1SVC-Pred 74 %    FEV1SVC-Pre 85 %   General PFT Lab (Please always keep checked)    Collection Time: 10/03/24  9:24 AM   Result Value Ref Range    FVC-Pred 3.21 L    FVC-Pre 2.70 L    FVC-%Pred-Pre 84 %    FEV1-Pre 2.53 L    FEV1-%Pred-Pre 98 %    FEV1FVC-Pred 81 %    FEV1FVC-Pre 94 %    FEFMax-Pred 6.76 L/sec    FEFMax-Pre 9.17 L/sec    FEFMax-%Pred-Pre 135 %    FEF2575-Pred 2.43 L/sec    FEF2575-Pre 5.58  L/sec    [id number]-%Pred-Pre 229 %    ExpTime-Pre 4.29 sec    FIFMax-Pre 4.38 L/sec    VC-Pred 3.47 L    VC-Pre 2.96 L    VC-%Pred-Pre 85 %    IC-Pred 2.38 L    IC-Pre 1.85 L    IC-%Pred-Pre 77 %    ERV-Pred 1.19 L    ERV-Pre 1.11 L    ERV-%Pred-Pre 92 %    FEV1FEV6-Pred 81 %    FEV1FEV6-Pre 94 %    FRCPleth-Pred 2.78 L    FRCPleth-Pre 2.63 L    FRCPleth-%Pred-Pre 94 %    RVPleth-Pred 1.90 L    RVPleth-Pre 1.52 L    RVPleth-%Pred-Pre 80 %    TLCPleth-Pred 5.19 L    TLCPleth-Pre 4.48 L    TLCPleth-%Pred-Pre 86 %    DLCOunc-Pred 20.93 ml/min/mmHg    DLCOunc-Pre 12.58 ml/min/mmHg    DLCOunc-%Pred-Pre 60 %    DLCOcor-Pre 12.26 ml/min/mmHg    DLCOcor-%Pred-Pre 58 %    VA-Pre 3.68 L    VA-%Pred-Pre 73 %    FEV1SVC-Pred 74 %    FEV1SVC-Pre 85 %           FVC FEV1 TLC DLCO   12- [facility] 2.54 71% 2.31 81% 4.01 73% 11.14 52%   4- [facility] 2.85 87% 2.66 101% 4.15 80% 14.22 64%   7- 2.79 86% 2.61 100% 4.40 80% 13.45 63%   10- 2.87 88% 2.64 101% 4.19 80% 13.13 62%   2-1-2024 2.67 82% 2.50 96% 4.13 79% 13.81 65%   6-6-2024 2.69 83% 2.49 96% 4.40 84% 11.70 55%   10-3-2024 2.70 84% 2.53 98% 4.48 86% 12.58 60%       I reviewed pulmonary function test that was performed today.  This shows normal spirometry and lung volumes with a moderate diffusion defect.  Lung function is stable.    I reviewed labs today, and they are normal except for mildly elevated creatinine, which is new.    I reviewed results with the patient.          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   1.  Rheumatoid arthritis ILD.  PFT is stable on treatment, and we will continue mycophenolate 1000 mg twice a day and prednisone 5 mg daily.  She is tolerating these medications well.  I encouraged her to continue exercising as she is doing.  Since she is doing well, I would not recommend adding an antifibrotic at  this time, but we could could certainly consider this in the future if needed.  I will see her back in 4 months with full PFT.  I will likely get a repeat HRCT next year.  2.  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  She will continue Breo Ellipta inhaler 200-25.  If she continues to do well, then we could consider decreasing the strength of the Breo Ellipta to 100 mcg.  3.  High risk medication immunosuppression monitoring.  Creatinine today is minimally elevated.  She might be a little dehydrated.  I will recheck in a couple weeks when she comes back from her  vacation.  She is in agreement with this plan.  She will continue Bactrim for pneumocystis prophylaxis.  If her creatinine remains elevated, then I would stop the Bactrim and change to pentamadine nebs monthly.  She will continue calcium with vitamin D.  I will recheck labs at her next visit to monitor mycophenolate.  The longitudinal plan of care for the diagnosis(es)/condition(s) as documented were addressed during this visit. Due to the added complexity in care, I will continue to support Shelby in the subsequent management and with ongoing continuity of care.  I spent 42 minutes reviewing chart, reviewing test results, talking with and examining patient, formulating plan, and documentation on the day of the encounter.                Again, thank you for allowing me to participate in the care of your patient.        Sincerely,        Maria Antonia Villarreal MD

## 2024-10-03 NOTE — PROGRESS NOTES
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 She presented in 12/2022 for evaluation of worsening exertional dyspnea to [facility] in [place], and a high resolution CT showed mosaic attenuation and air trapping, traction bronchiectasis, peribronchovascular distribution of groundglass and reticular opacities. She was started on prednisone 40 mg daily. She was seen in our ILD clinic in December 2022, when I started a slow prednisone taper for her rheumatoid arthritis ILD, currently 5 mg daily, and started Breo ellipta for her bronchiolitis.  I started mycophenolate in July 2023 to treat her rheumatoid arthritis ILD as a steroid sparing agent, and she takes 1000 mg twice daily.  For her rheumatoid arthritis, she takes sulfasalazine and hydroxychloroquine.  Orencia was changed Kevzara (sarilumab, an IL-6 inhibitor).      Shelby reports having had several joint flares this summer.  We had discussed changing Orencia to tocilizumab for better control of her rheumatoid arthritis, but her insurance would not pay for this.  She instead started Kevzara (sarilumab), which is another IL-6 inhibitor.  She started this a few weeks ago.  She continues on prednisone 5 mg daily, mycophenolate 1000 mg twice a day and Breo Ellipta inhaler.    She reports an episode a month ago of increased cough with squeaking and a little dyspnea on exertion.  She took Flonase, and her symptoms improved.  She exercises by walking and doing Pilates, and denies dyspnea on exertion when exercising.  She can walk up a flight of stairs without feeling short of breath, but she does endorse some shortness of breath when she walks uphill.  She averages 8-14,000 steps  per day.    She did receive both the flu vaccine and the new COVID-19 vaccine.  She reports that she had a liver biopsy in 2007, and I was able to find the report in Care everywhere.  This was a core liver biopsy that showed granulomatous inflammation by report.  Chest x-ray at that time was normal by the report I see in Care Everywhere.  She also had abnormal kidney function, and they were unclear of her diagnosis but felt that she might have sarcoidosis despite normal chest x-ray.  Her kidney function improved, and she has had no problem with her kidneys for many years.            Current Outpatient Medications   Medication Sig Dispense Refill    Calcium Carb-Cholecalciferol (CALTRATE BONE HEALTH) 600-20 MG-MCG TABS       ferrous sulfate (FEROSUL) 325 (65 Fe) MG tablet       fluticasone-vilanterol (BREO ELLIPTA) 200-25 MCG/ACT inhaler INHALE 1 PUFF INTO THE LUNGS DAILY GARGLE WITH WATER AFTER USE. 60 each 11    hydroxychloroquine (PLAQUENIL) 200 MG tablet Take 200 mg by mouth 2 times daily      KEVZARA 200 MG/1.14ML SOAJ       levothyroxine (SYNTHROID/LEVOTHROID) 175 MCG tablet Take 175 mcg by mouth daily.      lisinopril (ZESTRIL) 5 MG tablet Take 1 tablet by mouth daily      mycophenolate (GENERIC EQUIVALENT) 500 MG tablet TAKE 2 TABLETS TWO TIMES A  tablet 3    predniSONE (DELTASONE) 5 MG tablet 9 mg daily for 1 month, then 8 mg daily for 1 mo, then stay on 7 mg daily (Patient taking differently: Take 5 mg by mouth daily.) 90 tablet 4    sulfamethoxazole-trimethoprim (BACTRIM DS) 800-160 MG tablet 1 tablet on Mondays Wendesdays and Fridays 36 tablet 4    sulfaSALAzine (AZULFIDINE) 500 MG tablet Take 500 mg by mouth 2 times daily       No current facility-administered medications for this visit.     No Known Allergies  Past Medical History:   Diagnosis Date    ILD (interstitial lung disease) (H)     RA ILD dx 2022    RA (rheumatoid arthritis) (H)     dx 2003; 2022 +RF and CCP, but initially seroneg;  methotrexate [id number]       History reviewed. No pertinent surgical history.    Social History     Socioeconomic History    Marital status:      Spouse name: Not on file    Number of children: Not on file    Years of education: Not on file    Highest education level: Not on file   Occupational History    Not on file   Tobacco Use    Smoking status: Never    Smokeless tobacco: Never   Substance and Sexual Activity    Alcohol use: Yes    Drug use: Never    Sexual activity: Not on file   Other Topics Concern    Not on file   Social History Narrative    .  .        ILD exposure questions:    Occupation:     Asbestos: no    Silica: no    Mold: no    Pet bird: no    Hot tub: no    Portable humidifier: no    Brass or woodwind instruments: not since high school    Smoking tobacco or marijuana: no, no vaping    Feather pillow/blanket: no    Gardening: yes    Hobbies: walking, gardening, reading, fishing, travelling    Chemotherapy or radiation therapy: no    Fam hx of ILD: no      Social Determinants of Health     Financial Resource Strain: High Risk (1/1/2022)    [facility]    Financial Resource Strain     Difficulty of Paying Living Expenses: Not on file     Difficulty of Paying Living Expenses: Not on file   Food Insecurity: Not on file   Transportation Needs: Not on file   Physical Activity: Not on file   Stress: Not on file   Social Connections: Unknown (1/1/2022)    [facility], [facility]    Social Connections     Frequency of Communication with Friends and Family: Not on file   Interpersonal Safety: Not on file   Housing Stability: Not on file       Family History   Problem Relation Age of Onset    Rheumatoid Arthritis Sister     Interstitial Lung Disease No family hx of            Vitals: /78   Pulse 62    "Ht 1.664 m (5' 5.5\")   Wt 82.6 kg (182 lb)   SpO2 97%   BMI 29.83 kg/m      Exam:   GENERAL APPEARANCE: Well developed, well nourished, alert, and in no apparent distress.  RESP:Good air flow throughout. No inspiratory crackles. No rhonchi. No wheezes.  No inspiratory squeaks.  CV: Normal S1, S2, regular rhythm, normal rate. No murmur. No LE edema.   MS: Extremities normal. No clubbing. No cyanosis.  SKIN: No rash on limited exam.  NEURO: Mentation intact, speech normal, normal gait and stance.  PSYCH: Normal/bright affect.      Results:  Recent Results (from the past 168 hour(s))   Comprehensive metabolic panel    Collection Time: 10/03/24  9:15 AM   Result Value Ref Range    Sodium 139 135 - 145 mmol/L    Potassium 3.6 3.4 - 5.3 mmol/L    Carbon Dioxide (CO2) 28 22 - 29 mmol/L    Anion Gap 9 7 - 15 mmol/L    Urea Nitrogen 14.0 6.0 - 20.0 mg/dL    Creatinine 0.96 (H) 0.51 - 0.95 mg/dL    GFR Estimate 70 >60 mL/min/1.73m2    Calcium 9.1 8.8 - 10.4 mg/dL    Chloride 102 98 - 107 mmol/L    Glucose 56 (L) 70 - 99 mg/dL    Alkaline Phosphatase 45 40 - 150 U/L    AST 27 0 - 45 U/L    ALT 19 0 - 50 U/L    Protein Total 6.7 6.4 - 8.3 g/dL    Albumin 4.2 3.5 - 5.2 g/dL    Bilirubin Total 0.4 <=1.2 mg/dL   CBC with platelets and differential    Collection Time: 10/03/24  9:15 AM   Result Value Ref Range    WBC Count 5.7 4.0 - 11.0 10e3/uL    RBC Count 4.52 3.80 - 5.20 10e6/uL    Hemoglobin 14.3 11.7 - 15.7 g/dL    Hematocrit 43.6 35.0 - 47.0 %    MCV 97 78 - 100 fL    MCH 31.6 26.5 - 33.0 pg    MCHC 32.8 31.5 - 36.5 g/dL    RDW 13.3 10.0 - 15.0 %    Platelet Count 253 150 - 450 10e3/uL    % Neutrophils 56 %    % Lymphocytes 26 %    % Monocytes 11 %    % Eosinophils 4 %    % Basophils 2 %    % Immature Granulocytes 1 %    NRBCs per 100 WBC 0 <1 /100    Absolute Neutrophils 3.2 1.6 - 8.3 10e3/uL    Absolute Lymphocytes 1.5 0.8 - 5.3 10e3/uL    Absolute Monocytes 0.7 0.0 - 1.3 10e3/uL    Absolute Eosinophils 0.2 0.0 - 0.7 " 10e3/uL    Absolute Basophils 0.1 0.0 - 0.2 10e3/uL    Absolute Immature Granulocytes 0.0 <=0.4 10e3/uL    Absolute NRBCs 0.0 10e3/uL   General PFT Lab (Please always keep checked)    Collection Time: 10/03/24  9:24 AM   Result Value Ref Range    FVC-Pred 3.21 L    FVC-Pre 2.70 L    FVC-%Pred-Pre 84 %    FEV1-Pre 2.53 L    FEV1-%Pred-Pre 98 %    FEV1FVC-Pred 81 %    FEV1FVC-Pre 94 %    FEFMax-Pred 6.76 L/sec    FEFMax-Pre 9.17 L/sec    FEFMax-%Pred-Pre 135 %    FEF2575-Pred 2.43 L/sec    FEF2575-Pre 5.58 L/sec    SQF0859-%Pred-Pre 229 %    ExpTime-Pre 4.29 sec    FIFMax-Pre 4.38 L/sec    VC-Pred 3.47 L    VC-Pre 2.96 L    VC-%Pred-Pre 85 %    IC-Pred 2.38 L    IC-Pre 1.85 L    IC-%Pred-Pre 77 %    ERV-Pred 1.19 L    ERV-Pre 1.11 L    ERV-%Pred-Pre 92 %    FEV1FEV6-Pred 81 %    FEV1FEV6-Pre 94 %    FRCPleth-Pred 2.78 L    FRCPleth-Pre 2.63 L    FRCPleth-%Pred-Pre 94 %    RVPleth-Pred 1.90 L    RVPleth-Pre 1.52 L    RVPleth-%Pred-Pre 80 %    TLCPleth-Pred 5.19 L    TLCPleth-Pre 4.48 L    TLCPleth-%Pred-Pre 86 %    DLCOunc-Pred 20.93 ml/min/mmHg    DLCOunc-Pre 12.58 ml/min/mmHg    DLCOunc-%Pred-Pre 60 %    DLCOcor-Pre 12.26 ml/min/mmHg    DLCOcor-%Pred-Pre 58 %    VA-Pre 3.68 L    VA-%Pred-Pre 73 %    FEV1SVC-Pred 74 %    FEV1SVC-Pre 85 %   General PFT Lab (Please always keep checked)    Collection Time: 10/03/24  9:24 AM   Result Value Ref Range    FVC-Pred 3.21 L    FVC-Pre 2.70 L    FVC-%Pred-Pre 84 %    FEV1-Pre 2.53 L    FEV1-%Pred-Pre 98 %    FEV1FVC-Pred 81 %    FEV1FVC-Pre 94 %    FEFMax-Pred 6.76 L/sec    FEFMax-Pre 9.17 L/sec    FEFMax-%Pred-Pre 135 %    FEF2575-Pred 2.43 L/sec    FEF2575-Pre 5.58 L/sec    OHX1754-%Pred-Pre 229 %    ExpTime-Pre 4.29 sec    FIFMax-Pre 4.38 L/sec    VC-Pred 3.47 L    VC-Pre 2.96 L    VC-%Pred-Pre 85 %    IC-Pred 2.38 L    IC-Pre 1.85 L    IC-%Pred-Pre 77 %    ERV-Pred 1.19 L    ERV-Pre 1.11 L    ERV-%Pred-Pre 92 %    FEV1FEV6-Pred 81 %    FEV1FEV6-Pre 94 %    FRCPleth-Pred 2.78  L    FRCPleth-Pre 2.63 L    FRCPleth-%Pred-Pre 94 %    RVPleth-Pred 1.90 L    RVPleth-Pre 1.52 L    RVPleth-%Pred-Pre 80 %    TLCPleth-Pred 5.19 L    TLCPleth-Pre 4.48 L    TLCPleth-%Pred-Pre 86 %    DLCOunc-Pred 20.93 ml/min/mmHg    DLCOunc-Pre 12.58 ml/min/mmHg    DLCOunc-%Pred-Pre 60 %    DLCOcor-Pre 12.26 ml/min/mmHg    DLCOcor-%Pred-Pre 58 %    VA-Pre 3.68 L    VA-%Pred-Pre 73 %    FEV1SVC-Pred 74 %    FEV1SVC-Pre 85 %           FVC FEV1 TLC DLCO   12- [facility] 2.54 71% 2.31 81% 4.01 73% 11.14 52%   4- [facility] 2.85 87% 2.66 101% 4.15 80% 14.22 64%   7- 2.79 86% 2.61 100% 4.40 80% 13.45 63%   10- 2.87 88% 2.64 101% 4.19 80% 13.13 62%   2-1-2024 2.67 82% 2.50 96% 4.13 79% 13.81 65%   6-6-2024 2.69 83% 2.49 96% 4.40 84% 11.70 55%   10-3-2024 2.70 84% 2.53 98% 4.48 86% 12.58 60%       I reviewed pulmonary function test that was performed today.  This shows normal spirometry and lung volumes with a moderate diffusion defect.  Lung function is stable.    I reviewed labs today, and they are normal except for mildly elevated creatinine, which is new.    I reviewed results with the patient.          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   1.  Rheumatoid arthritis ILD.  PFT is stable on treatment, and we will continue mycophenolate 1000 mg twice a day and prednisone 5 mg daily.  She is tolerating these medications well.  I encouraged her to continue exercising as she is doing.  Since she is doing well, I would not recommend adding an antifibrotic at this time, but we could could certainly consider this in the future if needed.  I will see her back in 4 months with full PFT.  I will likely get a repeat HRCT next year.  2.  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  She will continue Breo Ellipta inhaler 200-25.  If she continues to do  well, then we could consider decreasing the strength of the Breo Ellipta to 100 mcg.  3.  High risk medication immunosuppression monitoring.  Creatinine today is minimally elevated.  She might be a little dehydrated.  I will recheck in a couple weeks when she comes back from her  vacation.  She is in agreement with this plan.  She will continue Bactrim for pneumocystis prophylaxis.  If her creatinine remains elevated, then I would stop the Bactrim and change to pentamadine nebs monthly.  She will continue calcium with vitamin D.  I will recheck labs at her next visit to monitor mycophenolate.  The longitudinal plan of care for the diagnosis(es)/condition(s) as documented were addressed during this visit. Due to the added complexity in care, I will continue to support Shelby in the subsequent management and with ongoing continuity of care.  I spent 42 minutes reviewing chart, reviewing test results, talking with and examining patient, formulating plan, and documentation on the day of the encounter.

## 2024-10-03 NOTE — NURSING NOTE
Chief Complaint   Patient presents with    RECHECK     Return Interstitial Lung      Medications reviewed and vital signs taken.   Patrick Hernandez, CMA

## 2024-10-05 LAB
DLCOCOR-%PRED-PRE: 58 %
DLCOCOR-%PRED-PRE: 58 %
DLCOCOR-PRE: 12.26 ML/MIN/MMHG
DLCOCOR-PRE: 12.26 ML/MIN/MMHG
DLCOUNC-%PRED-PRE: 60 %
DLCOUNC-%PRED-PRE: 60 %
DLCOUNC-PRE: 12.58 ML/MIN/MMHG
DLCOUNC-PRE: 12.58 ML/MIN/MMHG
DLCOUNC-PRED: 20.93 ML/MIN/MMHG
DLCOUNC-PRED: 20.93 ML/MIN/MMHG
ERV-%PRED-PRE: 92 %
ERV-%PRED-PRE: 92 %
ERV-PRE: 1.11 L
ERV-PRE: 1.11 L
ERV-PRED: 1.19 L
ERV-PRED: 1.19 L
EXPTIME-PRE: 4.29 SEC
EXPTIME-PRE: 4.29 SEC
FEF2575-%PRED-PRE: 229 %
FEF2575-%PRED-PRE: 229 %
FEF2575-PRE: 5.58 L/SEC
FEF2575-PRE: 5.58 L/SEC
FEF2575-PRED: 2.43 L/SEC
FEF2575-PRED: 2.43 L/SEC
FEFMAX-%PRED-PRE: 135 %
FEFMAX-%PRED-PRE: 135 %
FEFMAX-PRE: 9.17 L/SEC
FEFMAX-PRE: 9.17 L/SEC
FEFMAX-PRED: 6.76 L/SEC
FEFMAX-PRED: 6.76 L/SEC
FEV1-%PRED-PRE: 98 %
FEV1-%PRED-PRE: 98 %
FEV1-PRE: 2.53 L
FEV1-PRE: 2.53 L
FEV1FEV6-PRE: 94 %
FEV1FEV6-PRE: 94 %
FEV1FEV6-PRED: 81 %
FEV1FEV6-PRED: 81 %
FEV1FVC-PRE: 94 %
FEV1FVC-PRE: 94 %
FEV1FVC-PRED: 81 %
FEV1FVC-PRED: 81 %
FEV1SVC-PRE: 85 %
FEV1SVC-PRE: 85 %
FEV1SVC-PRED: 74 %
FEV1SVC-PRED: 74 %
FIFMAX-PRE: 4.38 L/SEC
FIFMAX-PRE: 4.38 L/SEC
FRCPLETH-%PRED-PRE: 94 %
FRCPLETH-%PRED-PRE: 94 %
FRCPLETH-PRE: 2.63 L
FRCPLETH-PRE: 2.63 L
FRCPLETH-PRED: 2.78 L
FRCPLETH-PRED: 2.78 L
FVC-%PRED-PRE: 84 %
FVC-%PRED-PRE: 84 %
FVC-PRE: 2.7 L
FVC-PRE: 2.7 L
FVC-PRED: 3.21 L
FVC-PRED: 3.21 L
IC-%PRED-PRE: 77 %
IC-%PRED-PRE: 77 %
IC-PRE: 1.85 L
IC-PRE: 1.85 L
IC-PRED: 2.38 L
IC-PRED: 2.38 L
RVPLETH-%PRED-PRE: 80 %
RVPLETH-%PRED-PRE: 80 %
RVPLETH-PRE: 1.52 L
RVPLETH-PRE: 1.52 L
RVPLETH-PRED: 1.9 L
RVPLETH-PRED: 1.9 L
TLCPLETH-%PRED-PRE: 86 %
TLCPLETH-%PRED-PRE: 86 %
TLCPLETH-PRE: 4.48 L
TLCPLETH-PRE: 4.48 L
TLCPLETH-PRED: 5.19 L
TLCPLETH-PRED: 5.19 L
VA-%PRED-PRE: 73 %
VA-%PRED-PRE: 73 %
VA-PRE: 3.68 L
VA-PRE: 3.68 L
VC-%PRED-PRE: 85 %
VC-%PRED-PRE: 85 %
VC-PRE: 2.96 L
VC-PRE: 2.96 L
VC-PRED: 3.47 L
VC-PRED: 3.47 L

## 2024-10-25 ENCOUNTER — LAB (OUTPATIENT)
Dept: LAB | Facility: CLINIC | Age: 54
End: 2024-10-25
Payer: COMMERCIAL

## 2024-10-25 ENCOUNTER — TELEPHONE (OUTPATIENT)
Facility: CLINIC | Age: 54
End: 2024-10-25

## 2024-10-25 DIAGNOSIS — J21.9 BRONCHIOLITIS: ICD-10-CM

## 2024-10-25 DIAGNOSIS — J84.9 ILD (INTERSTITIAL LUNG DISEASE) (H): ICD-10-CM

## 2024-10-25 LAB
ANION GAP SERPL CALCULATED.3IONS-SCNC: 10 MMOL/L (ref 7–15)
BUN SERPL-MCNC: 9.2 MG/DL (ref 6–20)
CALCIUM SERPL-MCNC: 8.8 MG/DL (ref 8.8–10.4)
CHLORIDE SERPL-SCNC: 101 MMOL/L (ref 98–107)
CREAT SERPL-MCNC: 0.89 MG/DL (ref 0.51–0.95)
EGFRCR SERPLBLD CKD-EPI 2021: 76 ML/MIN/1.73M2
GLUCOSE SERPL-MCNC: 91 MG/DL (ref 70–99)
HCO3 SERPL-SCNC: 27 MMOL/L (ref 22–29)
POTASSIUM SERPL-SCNC: 3.7 MMOL/L (ref 3.4–5.3)
SODIUM SERPL-SCNC: 138 MMOL/L (ref 135–145)

## 2024-10-25 PROCEDURE — 36415 COLL VENOUS BLD VENIPUNCTURE: CPT

## 2024-10-25 PROCEDURE — 80048 BASIC METABOLIC PNL TOTAL CA: CPT

## 2024-10-25 RX ORDER — PREDNISONE 5 MG/1
5 TABLET ORAL DAILY
Qty: 90 TABLET | Refills: 3 | Status: SHIPPED | OUTPATIENT
Start: 2024-10-25

## 2024-10-25 NOTE — TELEPHONE ENCOUNTER
[facility] Call Center    Phone Message    May a detailed message be left on voicemail: yes     Reason for Call: Medication Refill Request    Has the patient contacted the pharmacy for the refill? Yes   Name of medication being requested: predniSONE (DELTASONE) 5 MG tablet   Provider who prescribed the medication: Maria Antonia Villarreal MD   Pharmacy: [facility] - [address] - [address] [facility] AT [address]AL TO [place] SITES  Date medication is needed: Asap      Action Taken: Other: Pulm    Travel Screening: Not Applicable     Date of Service: 10/25/24

## 2024-12-31 DIAGNOSIS — M06.9 RHEUMATOID ARTHRITIS, INVOLVING UNSPECIFIED SITE, UNSPECIFIED WHETHER RHEUMATOID FACTOR PRESENT (H): ICD-10-CM

## 2024-12-31 DIAGNOSIS — J84.9 INTERSTITIAL LUNG DISEASE (H): ICD-10-CM

## 2024-12-31 RX ORDER — SULFAMETHOXAZOLE AND TRIMETHOPRIM 800; 160 MG/1; MG/1
TABLET ORAL
Qty: 36 TABLET | Refills: 4 | Status: SHIPPED | OUTPATIENT
Start: 2024-12-31

## 2025-01-25 ENCOUNTER — HEALTH MAINTENANCE LETTER (OUTPATIENT)
Age: 55
End: 2025-01-25

## 2025-02-07 ENCOUNTER — LAB (OUTPATIENT)
Dept: LAB | Facility: CLINIC | Age: 55
End: 2025-02-07
Attending: INTERNAL MEDICINE
Payer: COMMERCIAL

## 2025-02-07 DIAGNOSIS — J84.9 ILD (INTERSTITIAL LUNG DISEASE) (H): ICD-10-CM

## 2025-02-07 LAB
ALBUMIN SERPL BCG-MCNC: 4.3 G/DL (ref 3.5–5.2)
ALP SERPL-CCNC: 36 U/L (ref 40–150)
ALT SERPL W P-5'-P-CCNC: 19 U/L (ref 0–50)
ANION GAP SERPL CALCULATED.3IONS-SCNC: 8 MMOL/L (ref 7–15)
AST SERPL W P-5'-P-CCNC: 24 U/L (ref 0–45)
BASOPHILS # BLD AUTO: 0.1 10E3/UL (ref 0–0.2)
BASOPHILS NFR BLD AUTO: 2 %
BILIRUB SERPL-MCNC: 0.5 MG/DL
BUN SERPL-MCNC: 17.4 MG/DL (ref 6–20)
CALCIUM SERPL-MCNC: 9.3 MG/DL (ref 8.8–10.4)
CHLORIDE SERPL-SCNC: 105 MMOL/L (ref 98–107)
CREAT SERPL-MCNC: 0.89 MG/DL (ref 0.51–0.95)
EGFRCR SERPLBLD CKD-EPI 2021: 76 ML/MIN/1.73M2
EOSINOPHIL # BLD AUTO: 0.2 10E3/UL (ref 0–0.7)
EOSINOPHIL NFR BLD AUTO: 4 %
ERYTHROCYTE [DISTWIDTH] IN BLOOD BY AUTOMATED COUNT: 12.6 % (ref 10–15)
GLUCOSE SERPL-MCNC: 77 MG/DL (ref 70–99)
HCO3 SERPL-SCNC: 30 MMOL/L (ref 22–29)
HCT VFR BLD AUTO: 42.6 % (ref 35–47)
HGB BLD-MCNC: 13.9 G/DL (ref 11.7–15.7)
IMM GRANULOCYTES # BLD: 0 10E3/UL
IMM GRANULOCYTES NFR BLD: 0 %
LYMPHOCYTES # BLD AUTO: 1.6 10E3/UL (ref 0.8–5.3)
LYMPHOCYTES NFR BLD AUTO: 31 %
MCH RBC QN AUTO: 31.4 PG (ref 26.5–33)
MCHC RBC AUTO-ENTMCNC: 32.6 G/DL (ref 31.5–36.5)
MCV RBC AUTO: 96 FL (ref 78–100)
MONOCYTES # BLD AUTO: 0.6 10E3/UL (ref 0–1.3)
MONOCYTES NFR BLD AUTO: 11 %
NEUTROPHILS # BLD AUTO: 2.7 10E3/UL (ref 1.6–8.3)
NEUTROPHILS NFR BLD AUTO: 52 %
NRBC # BLD AUTO: 0 10E3/UL
NRBC BLD AUTO-RTO: 0 /100
PLATELET # BLD AUTO: 294 10E3/UL (ref 150–450)
POTASSIUM SERPL-SCNC: 4 MMOL/L (ref 3.4–5.3)
PROT SERPL-MCNC: 6.6 G/DL (ref 6.4–8.3)
RBC # BLD AUTO: 4.43 10E6/UL (ref 3.8–5.2)
SODIUM SERPL-SCNC: 143 MMOL/L (ref 135–145)
WBC # BLD AUTO: 5.3 10E3/UL (ref 4–11)

## 2025-02-07 PROCEDURE — 36415 COLL VENOUS BLD VENIPUNCTURE: CPT | Performed by: PATHOLOGY

## 2025-02-07 PROCEDURE — 85025 COMPLETE CBC W/AUTO DIFF WBC: CPT | Performed by: PATHOLOGY

## 2025-02-07 PROCEDURE — 80053 COMPREHEN METABOLIC PANEL: CPT | Performed by: PATHOLOGY

## 2025-04-07 ENCOUNTER — TRANSFERRED RECORDS (OUTPATIENT)
Dept: HEALTH INFORMATION MANAGEMENT | Facility: CLINIC | Age: 55
End: 2025-04-07
Payer: COMMERCIAL

## 2025-04-12 ENCOUNTER — HEALTH MAINTENANCE LETTER (OUTPATIENT)
Age: 55
End: 2025-04-12

## 2025-06-12 ENCOUNTER — LAB (OUTPATIENT)
Dept: LAB | Facility: CLINIC | Age: 55
End: 2025-06-12
Attending: INTERNAL MEDICINE
Payer: COMMERCIAL

## 2025-06-12 ENCOUNTER — ANCILLARY PROCEDURE (OUTPATIENT)
Dept: CT IMAGING | Facility: CLINIC | Age: 55
End: 2025-06-12
Attending: INTERNAL MEDICINE
Payer: COMMERCIAL

## 2025-06-12 ENCOUNTER — OFFICE VISIT (OUTPATIENT)
Dept: PULMONOLOGY | Facility: CLINIC | Age: 55
End: 2025-06-12
Attending: INTERNAL MEDICINE
Payer: COMMERCIAL

## 2025-06-12 VITALS
BODY MASS INDEX: 29.41 KG/M2 | OXYGEN SATURATION: 96 % | DIASTOLIC BLOOD PRESSURE: 82 MMHG | HEART RATE: 67 BPM | HEIGHT: 66 IN | SYSTOLIC BLOOD PRESSURE: 127 MMHG | WEIGHT: 183 LBS

## 2025-06-12 DIAGNOSIS — J84.9 ILD (INTERSTITIAL LUNG DISEASE) (H): Primary | ICD-10-CM

## 2025-06-12 DIAGNOSIS — J84.9 ILD (INTERSTITIAL LUNG DISEASE) (H): ICD-10-CM

## 2025-06-12 LAB
6 MIN WALK (FT): 1345 FT
6 MIN WALK (M): 410 M
ALBUMIN SERPL BCG-MCNC: 4.4 G/DL (ref 3.5–5.2)
ALP SERPL-CCNC: 44 U/L (ref 40–150)
ALT SERPL W P-5'-P-CCNC: 19 U/L (ref 0–50)
ANION GAP SERPL CALCULATED.3IONS-SCNC: 9 MMOL/L (ref 7–15)
AST SERPL W P-5'-P-CCNC: 25 U/L (ref 0–45)
BASOPHILS # BLD AUTO: 0.1 10E3/UL (ref 0–0.2)
BASOPHILS NFR BLD AUTO: 2 %
BILIRUB SERPL-MCNC: 0.4 MG/DL
BUN SERPL-MCNC: 16.4 MG/DL (ref 6–20)
CALCIUM SERPL-MCNC: 9.7 MG/DL (ref 8.8–10.4)
CHLORIDE SERPL-SCNC: 104 MMOL/L (ref 98–107)
CREAT SERPL-MCNC: 0.87 MG/DL (ref 0.51–0.95)
CRP SERPL-MCNC: <3 MG/L
DLCOUNC-%PRED-PRE: 68 %
DLCOUNC-PRE: 14.24 ML/MIN/MMHG
DLCOUNC-PRED: 20.86 ML/MIN/MMHG
EGFRCR SERPLBLD CKD-EPI 2021: 78 ML/MIN/1.73M2
EOSINOPHIL # BLD AUTO: 0.1 10E3/UL (ref 0–0.7)
EOSINOPHIL NFR BLD AUTO: 2 %
ERV-%PRED-PRE: 78 %
ERV-PRE: 0.93 L
ERV-PRED: 1.19 L
ERYTHROCYTE [DISTWIDTH] IN BLOOD BY AUTOMATED COUNT: 12.9 % (ref 10–15)
EXPTIME-PRE: 5.23 SEC
FEF2575-%PRED-PRE: 237 %
FEF2575-PRE: 5.7 L/SEC
FEF2575-PRED: 2.4 L/SEC
FEFMAX-%PRED-PRE: 133 %
FEFMAX-PRE: 8.96 L/SEC
FEFMAX-PRED: 6.73 L/SEC
FEV1-%PRED-PRE: 102 %
FEV1-PRE: 2.61 L
FEV1FEV6-PRE: 94 %
FEV1FEV6-PRED: 81 %
FEV1FVC-PRE: 94 %
FEV1FVC-PRED: 80 %
FEV1SVC-PRE: 92 %
FEV1SVC-PRED: 74 %
FIFMAX-PRE: 4.59 L/SEC
FRCPLETH-%PRED-PRE: 80 %
FRCPLETH-PRE: 2.4 L
FRCPLETH-PRED: 2.98 L
FVC-%PRED-PRE: 87 %
FVC-PRE: 2.79 L
FVC-PRED: 3.19 L
GLUCOSE SERPL-MCNC: 90 MG/DL (ref 70–99)
HCO3 SERPL-SCNC: 27 MMOL/L (ref 22–29)
HCT VFR BLD AUTO: 40.8 % (ref 35–47)
HGB BLD-MCNC: 13.3 G/DL (ref 11.7–15.7)
IC-%PRED-PRE: 80 %
IC-PRE: 1.9 L
IC-PRED: 2.37 L
IMM GRANULOCYTES # BLD: 0 10E3/UL
IMM GRANULOCYTES NFR BLD: 0 %
LYMPHOCYTES # BLD AUTO: 0.8 10E3/UL (ref 0.8–5.3)
LYMPHOCYTES NFR BLD AUTO: 12 %
MCH RBC QN AUTO: 31 PG (ref 26.5–33)
MCHC RBC AUTO-ENTMCNC: 32.6 G/DL (ref 31.5–36.5)
MCV RBC AUTO: 95 FL (ref 78–100)
MONOCYTES # BLD AUTO: 0.5 10E3/UL (ref 0–1.3)
MONOCYTES NFR BLD AUTO: 8 %
NEUTROPHILS # BLD AUTO: 5.2 10E3/UL (ref 1.6–8.3)
NEUTROPHILS NFR BLD AUTO: 77 %
NRBC # BLD AUTO: 0 10E3/UL
NRBC BLD AUTO-RTO: 0 /100
PLATELET # BLD AUTO: 259 10E3/UL (ref 150–450)
POTASSIUM SERPL-SCNC: 4 MMOL/L (ref 3.4–5.3)
PROT SERPL-MCNC: 6.6 G/DL (ref 6.4–8.3)
RBC # BLD AUTO: 4.29 10E6/UL (ref 3.8–5.2)
RVPLETH-%PRED-PRE: 77 %
RVPLETH-PRE: 1.47 L
RVPLETH-PRED: 1.91 L
SODIUM SERPL-SCNC: 140 MMOL/L (ref 135–145)
TLCPLETH-%PRED-PRE: 82 %
TLCPLETH-PRE: 4.31 L
TLCPLETH-PRED: 5.19 L
VA-%PRED-PRE: 72 %
VA-PRE: 3.63 L
VC-%PRED-PRE: 81 %
VC-PRE: 2.83 L
VC-PRED: 3.46 L
WBC # BLD AUTO: 6.7 10E3/UL (ref 4–11)

## 2025-06-12 PROCEDURE — 71250 CT THORAX DX C-: CPT | Mod: GC | Performed by: RADIOLOGY

## 2025-06-12 PROCEDURE — 99213 OFFICE O/P EST LOW 20 MIN: CPT | Performed by: INTERNAL MEDICINE

## 2025-06-12 ASSESSMENT — PAIN SCALES - GENERAL: PAINLEVEL_OUTOF10: NO PAIN (0)

## 2025-06-12 NOTE — PATIENT INSTRUCTIONS
1. Wear a KN95 mask outdoors when the air quality is bad  2. Stop mycophenolate 3 days before and restart 3 days after COVID vaccine

## 2025-06-12 NOTE — NURSING NOTE
Chief Complaint   Patient presents with    Follow Up     Return ILD - 4 mo       Vitals were taken, medications reconciled.    Nakia Mcintosh, Clinic Assistant   10:00 AM

## 2025-06-12 NOTE — LETTER
"6/12/2025      Shelby [place]  [id number] [address] [place] [address]      Dear Colleague,    Thank you for referring your patient, Shelby Bennett, to the [facility] FOR LUNG SCIENCE AND [facility] CLINIC [place]. Please see a copy of my visit note below.    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  Rheumatoid arthritis was diagnosed in approximately 2002.  She presented in 12/2022 with worsening exertional dyspnea to [facility] in [place], and a high resolution CT showed mosaic attenuation and air trapping, traction bronchiectasis, peribronchovascular distribution of groundglass and reticular opacities. She was started on prednisone 40 mg daily. She was seen in our ILD clinic in December 2022, when I started a slow prednisone taper for her rheumatoid arthritis ILD, currently 5 mg daily, and started Breo ellipta for her bronchiolitis.  I started mycophenolate in July 2023 to treat her rheumatoid arthritis ILD as a steroid sparing agent, and she takes 1000 mg twice daily.  For her rheumatoid arthritis, she takes sulfasalazine, hydroxychloroquine and Kevzara (sarilumab, an IL-6 inhibitor).      Today, Shelby reports that her breathing feels \"good.\"  She is able to walk up 1 flight of stairs and feels only \"a little\" short of breath.  However, she does feel short of breath when she walks up 2 flights of stairs.  This is stable.  She denies paroxysmal nocturnal dyspnea.  She endorses occasional cough when she has seasonal allergies.  She reports that her joints are under good control with her current regimen.    Ange exercises regularly with walking, yoga, Pilates and weights.  She currently is doing physical therapy " for back pain with walking.          Current Outpatient Medications   Medication Sig Dispense Refill     Calcium Carb-Cholecalciferol (CALTRATE BONE HEALTH) 600-20 MG-MCG TABS        ferrous sulfate (FEROSUL) 325 (65 Fe) MG tablet        fluticasone-vilanterol (BREO ELLIPTA) 200-25 MCG/ACT inhaler INHALE 1 PUFF INTO THE LUNGS DAILY GARGLE WITH WATER AFTER USE. 60 each 11     hydroxychloroquine (PLAQUENIL) 200 MG tablet Take 200 mg by mouth 2 times daily       KEVZARA 200 MG/1.14ML SOAJ        levothyroxine (SYNTHROID/LEVOTHROID) 175 MCG tablet Take 175 mcg by mouth daily.       lisinopril (ZESTRIL) 5 MG tablet Take 1 tablet by mouth daily       mycophenolate (GENERIC EQUIVALENT) 500 MG tablet TAKE 2 TABLETS TWO TIMES A  tablet 3     predniSONE (DELTASONE) 5 MG tablet Take 1 tablet (5 mg) by mouth daily. 90 tablet 3     sulfamethoxazole-trimethoprim (BACTRIM DS) 800-160 MG tablet 1 tablet on Mondays Wendesdays and Fridays 36 tablet 4     sulfaSALAzine (AZULFIDINE) 500 MG tablet Take 500 mg by mouth 2 times daily       No current facility-administered medications for this visit.     No Known Allergies  Past Medical History:   Diagnosis Date     ILD (interstitial lung disease) (H)     RA ILD dx 2022     RA (rheumatoid arthritis) (H)     dx 2003; 2022 +RF and CCP, but initially seroneg; methotrexate [id number]       No past surgical history on file.    Social History     Socioeconomic History     Marital status:      Spouse name: Not on file     Number of children: Not on file     Years of education: Not on file     Highest education level: Not on file   Occupational History     Not on file   Tobacco Use     Smoking status: Never     Smokeless tobacco: Never   Substance and Sexual Activity     Alcohol use: Yes     Drug use: Never     Sexual activity: Not on file   Other Topics Concern     Not on file   Social History Narrative    .  .        ILD exposure questions:    Occupation:      "Asbestos: no    Silica: no    Mold: no    Pet bird: no    Hot tub: no    Portable humidifier: no    Brass or woodwind instruments: not since high school    Smoking tobacco or marijuana: no, no vaping    Feather pillow/blanket: no    Gardening: yes    Hobbies: walking, gardening, reading, fishing, travelling    Chemotherapy or radiation therapy: no    Fam hx of ILD: no      Social Drivers of Health     Financial Resource Strain: Low Risk  (1/3/2025)    Received from [facility]    Financial Resource Strain      Difficulty of Paying Living Expenses: 3      Difficulty of Paying Living Expenses: Not on file   Food Insecurity: No Food Insecurity (1/3/2025)    Received from [facility]    Food Insecurity      Do you worry your food will run out before you are able to buy more?: 1   Transportation Needs: No Transportation Needs (1/3/2025)    Received from [facility]    Transportation Needs      Does lack of transportation keep you from medical appointments?: 1      Does lack of transportation keep you from work, meetings or getting things that you need?: 1   Physical Activity: Not on file   Stress: Not on file   Social Connections: Socially Integrated (1/3/2025)    Received from [facility]    Social Connections      Do you often feel lonely or isolated from those around you?: 0   Interpersonal Safety: Not on file   Housing Stability: Low Risk  (1/3/2025)    Received from [facility]    Housing Stability      What is your housing situation today?: 1       Family History   Problem Relation Age of Onset     Rheumatoid Arthritis Sister      Interstitial Lung Disease No family hx of            Vitals: /82 (BP Location: Left arm, Patient Position: Chair, Cuff Size: Adult Regular)   Pulse 67   Ht 1.664 m (5' 5.5\")   Wt 83 kg (183 lb)   SpO2 96%   BMI " 29.99 kg/m      Exam:   GENERAL APPEARANCE: Well developed, well nourished, alert, and in no apparent distress.  RESP:Good air flow throughout.  + bibasilar inspiratory crackles. No rhonchi. No wheezes.  No inspiratory squeaks.  CV: Normal S1, S2, regular rhythm, normal rate. No murmur. No LE edema.   MS: Extremities normal. No clubbing. No cyanosis.  SKIN: No rash on limited exam.  NEURO: Mentation intact, speech normal, normal gait and stance.  PSYCH: Normal/bright affect.       Results:  Recent Results (from the past week)   6 minute walk test    Collection Time: 06/12/25 12:00 AM   Result Value Ref Range    6 min walk (FT) 1,345 1,345 ft    6 Min Walk (M) 410 410 m   General PFT Lab (Please always keep checked)    Collection Time: 06/12/25  7:18 AM   Result Value Ref Range    FVC-Pred 3.19 L    FVC-Pre 2.79 L    FVC-%Pred-Pre 87 %    FEV1-Pre 2.61 L    FEV1-%Pred-Pre 102 %    FEV1FVC-Pred 80 %    FEV1FVC-Pre 94 %    FEFMax-Pred 6.73 L/sec    FEFMax-Pre 8.96 L/sec    FEFMax-%Pred-Pre 133 %    FEF2575-Pred 2.40 L/sec    FEF2575-Pre 5.70 L/sec    OKE9826-%Pred-Pre 237 %    ExpTime-Pre 5.23 sec    FIFMax-Pre 4.59 L/sec    VC-Pred 3.46 L    VC-Pre 2.83 L    VC-%Pred-Pre 81 %    IC-Pred 2.37 L    IC-Pre 1.90 L    IC-%Pred-Pre 80 %    ERV-Pred 1.19 L    ERV-Pre 0.93 L    ERV-%Pred-Pre 78 %    FEV1FEV6-Pred 81 %    FEV1FEV6-Pre 94 %    FRCPleth-Pred 2.98 L    FRCPleth-Pre 2.40 L    FRCPleth-%Pred-Pre 80 %    RVPleth-Pred 1.91 L    RVPleth-Pre 1.47 L    RVPleth-%Pred-Pre 77 %    TLCPleth-Pred 5.19 L    TLCPleth-Pre 4.31 L    TLCPleth-%Pred-Pre 82 %    DLCOunc-Pred 20.86 ml/min/mmHg    DLCOunc-Pre 14.24 ml/min/mmHg    DLCOunc-%Pred-Pre 68 %    VA-Pre 3.63 L    VA-%Pred-Pre 72 %    FEV1SVC-Pred 74 %    FEV1SVC-Pre 92 %   CRP inflammation    Collection Time: 06/12/25  9:35 AM   Result Value Ref Range    CRP Inflammation <3.00 <5.00 mg/L   Comprehensive metabolic panel    Collection Time: 06/12/25  9:35 AM   Result Value Ref  Range    Sodium 140 135 - 145 mmol/L    Potassium 4.0 3.4 - 5.3 mmol/L    Carbon Dioxide (CO2) 27 22 - 29 mmol/L    Anion Gap 9 7 - 15 mmol/L    Urea Nitrogen 16.4 6.0 - 20.0 mg/dL    Creatinine 0.87 0.51 - 0.95 mg/dL    GFR Estimate 78 >60 mL/min/1.73m2    Calcium 9.7 8.8 - 10.4 mg/dL    Chloride 104 98 - 107 mmol/L    Glucose 90 70 - 99 mg/dL    Alkaline Phosphatase 44 40 - 150 U/L    AST 25 0 - 45 U/L    ALT 19 0 - 50 U/L    Protein Total 6.6 6.4 - 8.3 g/dL    Albumin 4.4 3.5 - 5.2 g/dL    Bilirubin Total 0.4 <=1.2 mg/dL   CBC with platelets and differential    Collection Time: 06/12/25  9:35 AM   Result Value Ref Range    WBC Count 6.7 4.0 - 11.0 10e3/uL    RBC Count 4.29 3.80 - 5.20 10e6/uL    Hemoglobin 13.3 11.7 - 15.7 g/dL    Hematocrit 40.8 35.0 - 47.0 %    MCV 95 78 - 100 fL    MCH 31.0 26.5 - 33.0 pg    MCHC 32.6 31.5 - 36.5 g/dL    RDW 12.9 10.0 - 15.0 %    Platelet Count 259 150 - 450 10e3/uL    % Neutrophils 77 %    % Lymphocytes 12 %    % Monocytes 8 %    % Eosinophils 2 %    % Basophils 2 %    % Immature Granulocytes 0 %    NRBCs per 100 WBC 0 <1 /100    Absolute Neutrophils 5.2 1.6 - 8.3 10e3/uL    Absolute Lymphocytes 0.8 0.8 - 5.3 10e3/uL    Absolute Monocytes 0.5 0.0 - 1.3 10e3/uL    Absolute Eosinophils 0.1 0.0 - 0.7 10e3/uL    Absolute Basophils 0.1 0.0 - 0.2 10e3/uL    Absolute Immature Granulocytes 0.0 <=0.4 10e3/uL    Absolute NRBCs 0.0 10e3/uL            FVC FEV1 TLC DLCO   12- [facility] 2.54 71% 2.31 81% 4.01 73% 11.14 52%   4- [facility] 2.85 87% 2.66 101% 4.15 80% 14.22 64%   7- 2.79 86% 2.61 100% 4.40 80% 13.45 63%   10- 2.87 88% 2.64 101% 4.19 80% 13.13 62%   2-1-2024 2.67 82% 2.50 96% 4.13 79% 13.81 65%   6-6-2024 2.69 83% 2.49 96% 4.40 84% 11.70 55%   10-3-2024 2.70 84% 2.53 98% 4.48 86% 12.58 60%   2-7-2025 2.83 88% 2.62 102% 4.25 81% 15.21 72%   6- 2.79 87% 2.61 102% 4.31 82% 14.24 68%        I reviewed pulm function test that was performed today.  This  shows normal spirometry and lung volumes with a mild diffusion defect.  Lung function is stable.    I reviewed 6-minute walk test today, which shows normal walk distance on room air without exertional hypoxemia.      I reviewed labs today, which are normal including CRP.    I reviewed high-resolution chest CT from today.  This shows fibrotic NSIP pattern with air trapping which looks stable compared to July 2023.    I reviewed results with the patient.          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   1.  Rheumatoid arthritis-associated ILD.  PFT is stable and normal other than a mild diffusion defect.  Shelby's symptoms also are stable.  I have encouraged her to continue regular exercise as she is doing.  She will continue prednisone 5 mg daily with mycophenolate 1000 mg twice a day.  For her rheumatoid arthritis, she takes Plaquenil, Kevzara (sarilumab) and sulfasalazine.  I see no reason to change medications at this time.  I also would not add an antifibrotic at this time given the stability of her ILD.  However, that could be an option in the future.  If her RA ILD worsens, we could consider tocilizumab versus antifibrotic such as nintedanib or nerandomilast (if it gets approved by the FDA).  Shelby will return in 4 months with full PFT.  2.  Rheumatoid arthritis-associated bronchiolitis.  She will continue Breo Ellipta inhaler 200-25 mcg.  You could consider decreasing to 100 mcg in the future.  3.  High risk medication immunosuppression monitoring.  Labs are normal today.  Will recheck at her next visit to monitor mycophenolate.  Shelby will continue Bactrim for pneumocystis prophylaxis.  She will continue calcium with vitamin D, and DEXA scan was normal in 2024.    The longitudinal plan of care for the diagnosis(es)/condition(s) as documented were addressed during this visit.  Due to the added complexity in care, I will continue to support Shelby in the subsequent management and with ongoing continuity of care.    I spent 44 minutes reviewing chart, talking with and examining patient, reviewing test results, formulating plan and documentation on the day of the encounter (excluding PFT review).            Again, thank you for allowing me to participate in the care of your patient.        Sincerely,        Maria Antonia Villarreal MD    Electronically signed

## 2025-06-12 NOTE — PROGRESS NOTES
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  Rheumatoid arthritis was diagnosed in approximately 2002.  She presented in 12/2022 with worsening exertional dyspnea to [facility] in [place], and a high resolution CT showed mosaic attenuation and air trapping, traction bronchiectasis, peribronchovascular distribution of groundglass and reticular opacities. She was started on prednisone 40 mg daily. She was seen in our ILD clinic in December 2022, when I started a slow prednisone taper for her rheumatoid arthritis ILD, currently 5 mg daily, and started Breo ellipta for her bronchiolitis.  I started mycophenolate in July 2023 to treat her rheumatoid arthritis ILD as a steroid sparing agent, and she takes 1000 mg twice daily.  For her rheumatoid arthritis, she takes sulfasalazine, hydroxychloroquine and Kevzara (sarilumab, an IL-6 inhibitor).      Today, Shelby reports that her breathing feels \"good.\"  She is able to walk up 1 flight of stairs and feels only \"a little\" short of breath.  However, she does feel short of breath when she walks up 2 flights of stairs.  This is stable.  She denies paroxysmal nocturnal dyspnea.  She endorses occasional cough when she has seasonal allergies.  She reports that her joints are under good control with her current regimen.    Ange exercises regularly with walking, yoga, Pilates and weights.  She currently is doing physical therapy for back pain with walking.          Current Outpatient Medications   Medication Sig Dispense Refill    Calcium Carb-Cholecalciferol (CALTRATE BONE HEALTH) 600-20 MG-MCG TABS       ferrous sulfate (FEROSUL) 325 (65 Fe) MG tablet       fluticasone-vilanterol (BREO ELLIPTA) " 200-25 MCG/ACT inhaler INHALE 1 PUFF INTO THE LUNGS DAILY GARGLE WITH WATER AFTER USE. 60 each 11    hydroxychloroquine (PLAQUENIL) 200 MG tablet Take 200 mg by mouth 2 times daily      KEVZARA 200 MG/1.14ML SOAJ       levothyroxine (SYNTHROID/LEVOTHROID) 175 MCG tablet Take 175 mcg by mouth daily.      lisinopril (ZESTRIL) 5 MG tablet Take 1 tablet by mouth daily      mycophenolate (GENERIC EQUIVALENT) 500 MG tablet TAKE 2 TABLETS TWO TIMES A  tablet 3    predniSONE (DELTASONE) 5 MG tablet Take 1 tablet (5 mg) by mouth daily. 90 tablet 3    sulfamethoxazole-trimethoprim (BACTRIM DS) 800-160 MG tablet 1 tablet on Mondays Wendesdays and Fridays 36 tablet 4    sulfaSALAzine (AZULFIDINE) 500 MG tablet Take 500 mg by mouth 2 times daily       No current facility-administered medications for this visit.     No Known Allergies  Past Medical History:   Diagnosis Date    ILD (interstitial lung disease) (H)     RA ILD dx 2022    RA (rheumatoid arthritis) (H)     dx 2003; 2022 +RF and CCP, but initially seroneg; methotrexate [id number]       No past surgical history on file.    Social History     Socioeconomic History    Marital status:      Spouse name: Not on file    Number of children: Not on file    Years of education: Not on file    Highest education level: Not on file   Occupational History    Not on file   Tobacco Use    Smoking status: Never    Smokeless tobacco: Never   Substance and Sexual Activity    Alcohol use: Yes    Drug use: Never    Sexual activity: Not on file   Other Topics Concern    Not on file   Social History Narrative    .  .        ILD exposure questions:    Occupation:     Asbestos: no    Silica: no    Mold: no    Pet bird: no    Hot tub: no    Portable humidifier: no    Brass or woodwind instruments: not since high school    Smoking tobacco or marijuana: no, no vaping    Feather pillow/blanket: no    Gardening: yes    Hobbies: walking, gardening, reading, fishing,  "travelling    Chemotherapy or radiation therapy: no    Fam hx of ILD: no      Social Drivers of Health     Financial Resource Strain: Low Risk  (1/3/2025)    Received from [facility]    Financial Resource Strain     Difficulty of Paying Living Expenses: 3     Difficulty of Paying Living Expenses: Not on file   Food Insecurity: No Food Insecurity (1/3/2025)    [facility]    Food Insecurity     Do you worry your food will run out before you are able to buy more?: 1   Transportation Needs: No Transportation Needs (1/3/2025)    Received from [facility]    Transportation Needs     Does lack of transportation keep you from medical appointments?: 1     Does lack of transportation keep you from work, meetings or getting things that you need?: 1   Physical Activity: Not on file   Stress: Not on file   Social Connections: Socially Integrated (1/3/2025)    Received from [facility]    Social Connections     Do you often feel lonely or isolated from those around you?: 0   Interpersonal Safety: Not on file   Housing Stability: Low Risk  (1/3/2025)    Received from [facility]    Housing Stability     What is your housing situation today?: 1       Family History   Problem Relation Age of Onset    Rheumatoid Arthritis Sister     Interstitial Lung Disease No family hx of            Vitals: /82 (BP Location: Left arm, Patient Position: Chair, Cuff Size: Adult Regular)   Pulse 67   Ht 1.664 m (5' 5.5\")   Wt 83 kg (183 lb)   SpO2 96%   BMI 29.99 kg/m      Exam:   GENERAL APPEARANCE: Well developed, well nourished, alert, and in no apparent distress.  RESP:Good air flow throughout.  + bibasilar inspiratory crackles. No rhonchi. No wheezes.  No inspiratory squeaks.  CV: Normal S1, S2, regular rhythm, normal rate. No murmur. No LE edema.   MS: " Extremities normal. No clubbing. No cyanosis.  SKIN: No rash on limited exam.  NEURO: Mentation intact, speech normal, normal gait and stance.  PSYCH: Normal/bright affect.       Results:  Recent Results (from the past week)   6 minute walk test    Collection Time: 06/12/25 12:00 AM   Result Value Ref Range    6 min walk (FT) 1,345 1,345 ft    6 Min Walk (M) 410 410 m   General PFT Lab (Please always keep checked)    Collection Time: 06/12/25  7:18 AM   Result Value Ref Range    FVC-Pred 3.19 L    FVC-Pre 2.79 L    FVC-%Pred-Pre 87 %    FEV1-Pre 2.61 L    FEV1-%Pred-Pre 102 %    FEV1FVC-Pred 80 %    FEV1FVC-Pre 94 %    FEFMax-Pred 6.73 L/sec    FEFMax-Pre 8.96 L/sec    FEFMax-%Pred-Pre 133 %    FEF2575-Pred 2.40 L/sec    FEF2575-Pre 5.70 L/sec    [id number]-%Pred-Pre 237 %    ExpTime-Pre 5.23 sec    FIFMax-Pre 4.59 L/sec    VC-Pred 3.46 L    VC-Pre 2.83 L    VC-%Pred-Pre 81 %    IC-Pred 2.37 L    IC-Pre 1.90 L    IC-%Pred-Pre 80 %    ERV-Pred 1.19 L    ERV-Pre 0.93 L    ERV-%Pred-Pre 78 %    FEV1FEV6-Pred 81 %    FEV1FEV6-Pre 94 %    FRCPleth-Pred 2.98 L    FRCPleth-Pre 2.40 L    FRCPleth-%Pred-Pre 80 %    RVPleth-Pred 1.91 L    RVPleth-Pre 1.47 L    RVPleth-%Pred-Pre 77 %    TLCPleth-Pred 5.19 L    TLCPleth-Pre 4.31 L    TLCPleth-%Pred-Pre 82 %    DLCOunc-Pred 20.86 ml/min/mmHg    DLCOunc-Pre 14.24 ml/min/mmHg    DLCOunc-%Pred-Pre 68 %    VA-Pre 3.63 L    VA-%Pred-Pre 72 %    FEV1SVC-Pred 74 %    FEV1SVC-Pre 92 %   CRP inflammation    Collection Time: 06/12/25  9:35 AM   Result Value Ref Range    CRP Inflammation <3.00 <5.00 mg/L   Comprehensive metabolic panel    Collection Time: 06/12/25  9:35 AM   Result Value Ref Range    Sodium 140 135 - 145 mmol/L    Potassium 4.0 3.4 - 5.3 mmol/L    Carbon Dioxide (CO2) 27 22 - 29 mmol/L    Anion Gap 9 7 - 15 mmol/L    Urea Nitrogen 16.4 6.0 - 20.0 mg/dL    Creatinine 0.87 0.51 - 0.95 mg/dL    GFR Estimate 78 >60 mL/min/1.73m2    Calcium 9.7 8.8 - 10.4 mg/dL    Chloride 104 98  - 107 mmol/L    Glucose 90 70 - 99 mg/dL    Alkaline Phosphatase 44 40 - 150 U/L    AST 25 0 - 45 U/L    ALT 19 0 - 50 U/L    Protein Total 6.6 6.4 - 8.3 g/dL    Albumin 4.4 3.5 - 5.2 g/dL    Bilirubin Total 0.4 <=1.2 mg/dL   CBC with platelets and differential    Collection Time: 06/12/25  9:35 AM   Result Value Ref Range    WBC Count 6.7 4.0 - 11.0 10e3/uL    RBC Count 4.29 3.80 - 5.20 10e6/uL    Hemoglobin 13.3 11.7 - 15.7 g/dL    Hematocrit 40.8 35.0 - 47.0 %    MCV 95 78 - 100 fL    MCH 31.0 26.5 - 33.0 pg    MCHC 32.6 31.5 - 36.5 g/dL    RDW 12.9 10.0 - 15.0 %    Platelet Count 259 150 - 450 10e3/uL    % Neutrophils 77 %    % Lymphocytes 12 %    % Monocytes 8 %    % Eosinophils 2 %    % Basophils 2 %    % Immature Granulocytes 0 %    NRBCs per 100 WBC 0 <1 /100    Absolute Neutrophils 5.2 1.6 - 8.3 10e3/uL    Absolute Lymphocytes 0.8 0.8 - 5.3 10e3/uL    Absolute Monocytes 0.5 0.0 - 1.3 10e3/uL    Absolute Eosinophils 0.1 0.0 - 0.7 10e3/uL    Absolute Basophils 0.1 0.0 - 0.2 10e3/uL    Absolute Immature Granulocytes 0.0 <=0.4 10e3/uL    Absolute NRBCs 0.0 10e3/uL            FVC FEV1 TLC DLCO   12- [facility] 2.54 71% 2.31 81% 4.01 73% 11.14 52%   4- [facility] 2.85 87% 2.66 101% 4.15 80% 14.22 64%   7- 2.79 86% 2.61 100% 4.40 80% 13.45 63%   10- 2.87 88% 2.64 101% 4.19 80% 13.13 62%   2-1-2024 2.67 82% 2.50 96% 4.13 79% 13.81 65%   6-6-2024 2.69 83% 2.49 96% 4.40 84% 11.70 55%   10-3-2024 2.70 84% 2.53 98% 4.48 86% 12.58 60%   2-7-2025 2.83 88% 2.62 102% 4.25 81% 15.21 72%   6- 2.79 87% 2.61 102% 4.31 82% 14.24 68%        I reviewed pulm function test that was performed today.  This shows normal spirometry and lung volumes with a mild diffusion defect.  Lung function is stable.    I reviewed 6-minute walk test today, which shows normal walk distance on room air without exertional hypoxemia.      I reviewed labs today, which are normal including CRP.    I reviewed high-resolution chest  CT from today.  This shows fibrotic NSIP pattern with air trapping which looks stable compared to July 2023.    I reviewed results with the patient.          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   1.  Rheumatoid arthritis-associated ILD.  PFT is stable and normal other than a mild diffusion defect.  Shelby's symptoms also are stable.  I have encouraged her to continue regular exercise as she is doing.  She will continue prednisone 5 mg daily with mycophenolate 1000 mg twice a day.  For her rheumatoid arthritis, she takes Plaquenil, Kevzara (sarilumab) and sulfasalazine.  I see no reason to change medications at this time.  I also would not add an antifibrotic at this time given the stability of her ILD.  However, that could be an option in the future.  If her RA ILD worsens, we could consider tocilizumab versus antifibrotic such as nintedanib or nerandomilast (if it gets approved by the FDA).  Shelby will return in 4 months with full PFT.  2.  Rheumatoid arthritis-associated bronchiolitis.  She will continue Breo Ellipta inhaler 200-25 mcg.  You could consider decreasing to 100 mcg in the future.  3.  High risk medication immunosuppression monitoring.  Labs are normal today.  Will recheck at her next visit to monitor mycophenolate.  Shelby will continue Bactrim for pneumocystis prophylaxis.  She will continue calcium with vitamin D, and DEXA scan was normal in 2024.    The longitudinal plan of care for the diagnosis(es)/condition(s) as documented were addressed during this visit. Due to the added complexity in care, I will continue to support Shelby in the subsequent management and with ongoing continuity of care.    I spent 44 minutes reviewing chart, talking with and examining patient, reviewing test results, formulating plan and documentation on the day of the encounter  (excluding PFT review).

## 2025-07-08 DIAGNOSIS — J84.9 ILD (INTERSTITIAL LUNG DISEASE) (H): ICD-10-CM

## 2025-07-08 RX ORDER — FLUTICASONE FUROATE AND VILANTEROL TRIFENATATE 200; 25 UG/1; UG/1
POWDER RESPIRATORY (INHALATION)
Qty: 180 EACH | Refills: 1 | Status: SHIPPED | OUTPATIENT
Start: 2025-07-08